# Patient Record
Sex: FEMALE | Race: WHITE | NOT HISPANIC OR LATINO | Employment: FULL TIME | URBAN - METROPOLITAN AREA
[De-identification: names, ages, dates, MRNs, and addresses within clinical notes are randomized per-mention and may not be internally consistent; named-entity substitution may affect disease eponyms.]

---

## 2017-01-18 DIAGNOSIS — I10 ESSENTIAL HYPERTENSION, MALIGNANT: Primary | ICD-10-CM

## 2017-01-18 NOTE — TELEPHONE ENCOUNTER
Marli refill was provided to patient on 12/26/2016.    Team - please assist patient with scheduling an office visit, then route refill request for provider for review.    Lupis Arevalo RN

## 2017-01-18 NOTE — TELEPHONE ENCOUNTER
lisinopril (PRINIVIL/ZESTRIL) 20 MG tablet      Last Written Prescription Date: 12/26/16  Last Fill Quantity: 30, # refills: 0  Last Office Visit with G, P or [facility] prescribing provider: 12/31/15       POTASSIUM   Date Value Ref Range Status   08/25/2016 4.1 3.4 - 5.3 mmol/L Final     CREATININE   Date Value Ref Range Status   08/25/2016 0.67 0.52 - 1.04 mg/dL Final     BP Readings from Last 3 Encounters:   12/31/15 118/74   08/25/14 118/70   08/02/13 117/73

## 2017-01-20 RX ORDER — LISINOPRIL 20 MG/1
TABLET ORAL
Qty: 30 TABLET | Refills: 0 | Status: SHIPPED | OUTPATIENT
Start: 2017-01-20 | End: 2017-02-03

## 2017-01-26 ENCOUNTER — TRANSFERRED RECORDS (OUTPATIENT)
Dept: HEALTH INFORMATION MANAGEMENT | Facility: CLINIC | Age: 52
End: 2017-01-26

## 2017-02-03 ENCOUNTER — OFFICE VISIT (OUTPATIENT)
Dept: FAMILY MEDICINE | Facility: CLINIC | Age: 52
End: 2017-02-03
Payer: COMMERCIAL

## 2017-02-03 VITALS
HEART RATE: 93 BPM | DIASTOLIC BLOOD PRESSURE: 60 MMHG | RESPIRATION RATE: 12 BRPM | BODY MASS INDEX: 27.91 KG/M2 | WEIGHT: 157.5 LBS | SYSTOLIC BLOOD PRESSURE: 110 MMHG | TEMPERATURE: 98.2 F | OXYGEN SATURATION: 99 % | HEIGHT: 63 IN

## 2017-02-03 DIAGNOSIS — Z23 NEED FOR PROPHYLACTIC VACCINATION AND INOCULATION AGAINST INFLUENZA: ICD-10-CM

## 2017-02-03 DIAGNOSIS — Z12.11 SCREEN FOR COLON CANCER: ICD-10-CM

## 2017-02-03 DIAGNOSIS — Z12.31 VISIT FOR SCREENING MAMMOGRAM: ICD-10-CM

## 2017-02-03 DIAGNOSIS — Z23 NEED FOR SHINGLES VACCINE: ICD-10-CM

## 2017-02-03 DIAGNOSIS — I10 BENIGN ESSENTIAL HYPERTENSION: Primary | ICD-10-CM

## 2017-02-03 LAB
ANION GAP SERPL CALCULATED.3IONS-SCNC: 11 MMOL/L (ref 3–14)
BUN SERPL-MCNC: 14 MG/DL (ref 7–30)
CALCIUM SERPL-MCNC: 8.8 MG/DL (ref 8.5–10.1)
CHLORIDE SERPL-SCNC: 102 MMOL/L (ref 94–109)
CO2 SERPL-SCNC: 28 MMOL/L (ref 20–32)
CREAT SERPL-MCNC: 0.82 MG/DL (ref 0.52–1.04)
CREAT UR-MCNC: 91 MG/DL
GFR SERPL CREATININE-BSD FRML MDRD: 74 ML/MIN/1.7M2
GLUCOSE SERPL-MCNC: 94 MG/DL (ref 70–99)
MICROALBUMIN UR-MCNC: <5 MG/L
MICROALBUMIN/CREAT UR: NORMAL MG/G CR (ref 0–25)
POTASSIUM SERPL-SCNC: 4 MMOL/L (ref 3.4–5.3)
SODIUM SERPL-SCNC: 141 MMOL/L (ref 133–144)
TSH SERPL DL<=0.005 MIU/L-ACNC: 1.56 MU/L (ref 0.4–4)

## 2017-02-03 PROCEDURE — 90471 IMMUNIZATION ADMIN: CPT | Performed by: FAMILY MEDICINE

## 2017-02-03 PROCEDURE — 90686 IIV4 VACC NO PRSV 0.5 ML IM: CPT | Performed by: FAMILY MEDICINE

## 2017-02-03 PROCEDURE — 80048 BASIC METABOLIC PNL TOTAL CA: CPT | Performed by: FAMILY MEDICINE

## 2017-02-03 PROCEDURE — 90472 IMMUNIZATION ADMIN EACH ADD: CPT | Performed by: FAMILY MEDICINE

## 2017-02-03 PROCEDURE — 36415 COLL VENOUS BLD VENIPUNCTURE: CPT | Performed by: FAMILY MEDICINE

## 2017-02-03 PROCEDURE — 84443 ASSAY THYROID STIM HORMONE: CPT | Performed by: FAMILY MEDICINE

## 2017-02-03 PROCEDURE — 82043 UR ALBUMIN QUANTITATIVE: CPT | Performed by: FAMILY MEDICINE

## 2017-02-03 PROCEDURE — 99213 OFFICE O/P EST LOW 20 MIN: CPT | Mod: 25 | Performed by: FAMILY MEDICINE

## 2017-02-03 PROCEDURE — 90736 HZV VACCINE LIVE SUBQ: CPT | Performed by: FAMILY MEDICINE

## 2017-02-03 RX ORDER — LISINOPRIL 20 MG/1
20 TABLET ORAL DAILY
Qty: 90 TABLET | Refills: 3 | Status: SHIPPED | OUTPATIENT
Start: 2017-02-03 | End: 2018-04-10

## 2017-02-03 NOTE — MR AVS SNAPSHOT
After Visit Summary   2/3/2017    Ruth Resendez    MRN: [id number]           Patient Information     Date Of Birth          1965        Visit Information        Provider Department      2/3/2017 1:20 PM Renee Mendez MD [facility]        Today's Diagnoses     Benign essential hypertension    -  1     Screen for colon cancer         Visit for screening mammogram         Need for prophylactic vaccination and inoculation against influenza         Need for shingles vaccine           Care Instructions    Please call [facility] (formerly called [facility]) at [phone] to schedule colonoscopy.     Increase aerobic exercise.         Follow-ups after your visit        Additional Services     GASTROENTEROLOGY ADULT REF PROCEDURE ONLY       Last Lab Result: CREATININE (mg/dL)       Date                     Value                 08/25/2016               0.67             ----------  Body mass index is 27.91 kg/(m^2).     Needed:  No  Language:  English    Patient will be contacted to schedule procedure.     Please be aware that coverage of these services is subject to the terms and limitations of your health insurance plan.  Call member services at your health plan with any benefit or coverage questions.  Any procedures must be performed at a [facility] facility OR coordinated by your clinic's referral office.    Please bring the following with you to your appointment:    (1) Any X-Rays, CTs or MRIs which have been performed.  Contact the facility where they were done to arrange for  prior to your scheduled appointment.    (2) List of current medications   (3) This referral request   (4) Any documents/labs given to you for this referral                  Your next 10 appointments already scheduled     Feb 16, 2017  9:00 AM   PHYSICAL with Martin Parsons MD   [facility]  [facility] [place])    [address]  [address]  [place] [address]   [phone]            Feb 16, 2017  9:30 AM   MA SCREEN WITH IMPLANTS DIGITAL BILAT with WEMA1   [facility] [place] ([facility] [place])    [address], [address]  [place] [address]   [phone]           Do not use any powder, lotion or deodorant under your arms or on your breast. If you do, we will ask you to remove it before your exam.  Wear comfortable, two-piece clothing.  If you have any allergies, tell your care team.  Bring any previous mammograms from other facilities or have them mailed to the breast center.              Who to contact     If you have questions or need follow up information about today's clinic visit or your schedule please contact [facility] directly at [phone].  Normal or non-critical lab and imaging results will be communicated to you by Wattvisionhart, letter or phone within 4 business days after the clinic has received the results. If you do not hear from us within 7 days, please contact the clinic through Wattvisionhart or phone. If you have a critical or abnormal lab result, we will notify you by phone as soon as possible.  Submit refill requests through Vital Vio or call your pharmacy and they will forward the refill request to us. Please allow 3 business days for your refill to be completed.          Additional Information About Your Visit        Vital Vio Information     Vital Vio gives you secure access to your electronic health record. If you see a primary care provider, you can also send messages to your care team and make appointments. If you have questions, please call your primary care clinic.  If you do not have a primary care provider, please call [phone] and they will assist you.        Care EveryWhere ID     This is your Care EveryWhere ID. This could be used by other organizations to access your [facility]  "records  [id number]        Your Vitals Were     Pulse Temperature Respirations Height BMI (Body Mass Index) Pulse Oximetry    93 98.2  F (36.8  C) (Oral) 12 5' 3\" (1.6 m) 27.91 kg/m2 99%       Blood Pressure from Last 3 Encounters:   02/03/17 110/60   12/31/15 118/74   08/25/14 118/70    Weight from Last 3 Encounters:   02/03/17 157 lb 8 oz (71.442 kg)   12/31/15 154 lb 12.8 oz (70.217 kg)   08/25/14 147 lb 1.6 oz (66.724 kg)              We Performed the Following     Albumin Random Urine Quantitative     Basic metabolic panel     GASTROENTEROLOGY ADULT REF PROCEDURE ONLY     TSH with free T4 reflex     ZOSTER VACC LIVE SUBQ NJX          Today's Medication Changes          These changes are accurate as of: 2/3/17  2:05 PM.  If you have any questions, ask your nurse or doctor.               These medicines have changed or have updated prescriptions.        Dose/Directions    lisinopril 20 MG tablet   Commonly known as:  PRINIVIL/ZESTRIL   This may have changed:  See the new instructions.   Used for:  Benign essential hypertension   Changed by:  Renee Mendez MD        Dose:  20 mg   Take 1 tablet (20 mg) by mouth daily   Quantity:  90 tablet   Refills:  3            Where to get your medicines      These medications were sent to [facility] - [place], [place] - [address]  [address], [facility] [address]    Hours:  Mon-Fri 8:30am-5:00pm Toll Free [phone] Phone:  [phone]    - lisinopril 20 MG tablet             Primary Care Provider Office Phone # Fax #    Renee Mendez [phone]       [facility] [address] [place] [address] [address]  [facility] [address]        Thank you!     Thank you for choosing [facility]  for your care. Our goal is always to provide you with excellent care. Hearing back from our patients is one way we can continue to improve our services. Please take a few minutes to complete the " written survey that you may receive in the mail after your visit with us. Thank you!             Your Updated Medication List - Protect others around you: Learn how to safely use, store and throw away your medicines at www.disposemymeds.org.          This list is accurate as of: 2/3/17  2:05 PM.  Always use your most recent med list.                   Brand Name Dispense Instructions for use    lisinopril 20 MG tablet    PRINIVIL/ZESTRIL    90 tablet    Take 1 tablet (20 mg) by mouth daily

## 2017-02-03 NOTE — PROGRESS NOTES
SUBJECTIVE:                                                    Ruth Resendez is a 51 year old female who presents to clinic today for the following health issues:      Hypertension Follow-up      Outpatient blood pressures are being checked at home.  Results are 125/70.    Low Salt Diet: no added salt       Amount of exercise or physical activity: 2-3 days/week for an average of 45-60 minutes    Problems taking medications regularly: No    Medication side effects: none    Diet: regular (no restrictions)        Problem list and histories reviewed & adjusted, as indicated.  Additional history: as documented      HTN: Home readings are 125/70. Denies lightheadedness or dizziness. She will have rare times where she feels lightheaded after bending and getting up too quickly but this has been better since we last talked. Denies chest pain/pressure, palpitations.     Additional Notes  -She is still having periods but they are not regular. She sees gyn yearly  -She states despite her efforts, she is unable to lose weight. She is doing a barre class 3-4x a week and she is eating healthy.   -Denies blood in stool or changes in stool.   -Mammogram scheduled for  with gyn office, gets yearly.   -reflux is controlled.          Patient Active Problem List   Diagnosis     Hypertension goal BP (blood pressure) < 140/90     CARDIOVASCULAR SCREENING; LDL GOAL LESS THAN 160     GERD (gastroesophageal reflux disease)     Rhinitis     Microscopic hematuria     Past Surgical History   Procedure Laterality Date     Surgical history of -        eye surgery as a child following a sledding injury       Social History   Substance Use Topics     Smoking status: Never Smoker      Smokeless tobacco: Never Used     Alcohol Use: Yes     Family History   Problem Relation Age of Onset     Respiratory Father      lung disease, former smoker, fatal age histiocystosis, lawn chem,  age 62     CANCER Paternal Grandmother      throat  "cancer     Asthma No family hx of      C.A.D. No family hx of      DIABETES No family hx of      Hypertension No family hx of      CEREBROVASCULAR DISEASE No family hx of      Breast Cancer No family hx of      Cancer - colorectal Other      uncle (I didn't ask whether maternal or paternal), fatal in 60s     Prostate Cancer No family hx of      Neurologic Disorder Mother      parkinsons onset at 66         Current Outpatient Prescriptions   Medication Sig Dispense Refill     lisinopril (PRINIVIL/ZESTRIL) 20 MG tablet TAKE 1 TABLET BY MOUTH EVERY DAY 30 tablet 0     No Known Allergies    ROS:  Constitutional, HEENT, cardiovascular, pulmonary, gi and gu systems are negative, except as otherwise noted.    OBJECTIVE:                                                    /60 mmHg  Pulse 93  Temp(Src) 98.2  F (36.8  C) (Oral)  Resp 12  Ht 1.6 m (5' 3\")  Wt 71.442 kg (157 lb 8 oz)  BMI 27.91 kg/m2  SpO2 99%  Body mass index is 27.91 kg/(m^2).     GENERAL: healthy, alert and no distress  RESP: lungs clear to auscultation - no rales, rhonchi or wheezes  CV: regular rate and rhythm, normal S1 S2, no S3 or S4, no murmur, click or rub, no peripheral edema and peripheral pulses strong  MS: no gross musculoskeletal defects noted, no edema  PSYCH: mentation appears normal, affect normal/bright    Diagnostic Test Results:  none      ASSESSMENT/PLAN:                                                        1. Benign essential hypertension  At goal. Continue current medications.orthostatic precautions reviewed.   - lisinopril (PRINIVIL/ZESTRIL) 20 MG tablet; Take 1 tablet (20 mg) by mouth daily  Dispense: 90 tablet; Refill: 3  - Basic metabolic panel  - TSH with free T4 reflex  - Albumin Random Urine Quantitative    2. Screen for colon cancer  Reviewed colon cancer screening indications/testing. She will schedule  - GASTROENTEROLOGY ADULT REF PROCEDURE ONLY    3. Visit for screening mammogram  Mammogram scheduled.     4. Need " for prophylactic vaccination and inoculation against influenza  Vaccine given today.      5. Need for shingles vaccine  Vaccine given today. Patient aware this med may not be covered by insurance.   - ZOSTER VACC LIVE SUBQ NJX    See Patient Instructions  Patient Instructions   Please call [facility] (formerly called [facility]) at [phone] to schedule colonoscopy.     Increase aerobic exercise.           This document serves as a record of the services and decisions personally performed and made by Renee Mendez MD. It was created on her behalf by Lissette Willoughby,a trained medical scribe. The creation of this document is based the provider's statements to the medical scribe.  Lissette Willoughby February 3, 2017 1:34 PM       Renee Mendez MD  [facility]

## 2017-02-03 NOTE — NURSING NOTE
"Chief Complaint   Patient presents with     Refill Request       Initial /60 mmHg  Pulse 93  Temp(Src) 98.2  F (36.8  C) (Oral)  Resp 12  Ht 1.6 m (5' 3\")  Wt 71.442 kg (157 lb 8 oz)  BMI 27.91 kg/m2  SpO2 99% Estimated body mass index is 27.91 kg/(m^2) as calculated from the following:    Height as of this encounter: 1.6 m (5' 3\").    Weight as of this encounter: 71.442 kg (157 lb 8 oz).  BP completed using cuff size: zaida Headley        "

## 2017-02-03 NOTE — Clinical Note
Please abstract the following data from this visit with this patient into the appropriate field in Epic:  Mammogram done on this date: 11/2015,  (approximately), by this group: [facility], results were normal.

## 2017-02-03 NOTE — PROGRESS NOTES
Injectable Influenza Immunization Documentation    1.  Is the person to be vaccinated sick today?  No    2. Does the person to be vaccinated have an allergy to eggs or to a component of the vaccine?  No    3. Has the person to be vaccinated today ever had a serious reaction to influenza vaccine in the past?  No    4. Has the person to be vaccinated ever had Guillain-[place] syndrome?  No     Form completed by Adrianne

## 2017-02-03 NOTE — PATIENT INSTRUCTIONS
Please call [facility] (formerly called [facility]) at [phone] to schedule colonoscopy.     Increase aerobic exercise.

## 2017-02-08 VITALS
HEART RATE: 84 BPM | HEIGHT: 64 IN | BODY MASS INDEX: 25.1 KG/M2 | WEIGHT: 147 LBS | DIASTOLIC BLOOD PRESSURE: 86 MMHG | SYSTOLIC BLOOD PRESSURE: 124 MMHG

## 2017-02-16 ENCOUNTER — OFFICE VISIT (OUTPATIENT)
Dept: OBGYN | Facility: CLINIC | Age: 52
End: 2017-02-16
Payer: COMMERCIAL

## 2017-02-16 ENCOUNTER — RADIANT APPOINTMENT (OUTPATIENT)
Dept: MAMMOGRAPHY | Facility: CLINIC | Age: 52
End: 2017-02-16
Payer: COMMERCIAL

## 2017-02-16 VITALS
SYSTOLIC BLOOD PRESSURE: 100 MMHG | HEART RATE: 72 BPM | BODY MASS INDEX: 27.29 KG/M2 | HEIGHT: 63 IN | DIASTOLIC BLOOD PRESSURE: 72 MMHG | WEIGHT: 154 LBS

## 2017-02-16 DIAGNOSIS — Z12.31 VISIT FOR SCREENING MAMMOGRAM: ICD-10-CM

## 2017-02-16 DIAGNOSIS — Z01.419 ENCOUNTER FOR GYNECOLOGICAL EXAMINATION WITHOUT ABNORMAL FINDING: Primary | ICD-10-CM

## 2017-02-16 DIAGNOSIS — I10 HYPERTENSION GOAL BP (BLOOD PRESSURE) < 140/90: ICD-10-CM

## 2017-02-16 PROCEDURE — 99396 PREV VISIT EST AGE 40-64: CPT | Performed by: OBSTETRICS & GYNECOLOGY

## 2017-02-16 PROCEDURE — 87624 HPV HI-RISK TYP POOLED RSLT: CPT | Performed by: OBSTETRICS & GYNECOLOGY

## 2017-02-16 PROCEDURE — G0145 SCR C/V CYTO,THINLAYER,RESCR: HCPCS | Performed by: OBSTETRICS & GYNECOLOGY

## 2017-02-16 PROCEDURE — G0202 SCR MAMMO BI INCL CAD: HCPCS | Mod: TC

## 2017-02-16 ASSESSMENT — ANXIETY QUESTIONNAIRES
7. FEELING AFRAID AS IF SOMETHING AWFUL MIGHT HAPPEN: NOT AT ALL
1. FEELING NERVOUS, ANXIOUS, OR ON EDGE: NOT AT ALL
2. NOT BEING ABLE TO STOP OR CONTROL WORRYING: NOT AT ALL
6. BECOMING EASILY ANNOYED OR IRRITABLE: NOT AT ALL
GAD7 TOTAL SCORE: 0
3. WORRYING TOO MUCH ABOUT DIFFERENT THINGS: NOT AT ALL
5. BEING SO RESTLESS THAT IT IS HARD TO SIT STILL: NOT AT ALL

## 2017-02-16 ASSESSMENT — PATIENT HEALTH QUESTIONNAIRE - PHQ9: 5. POOR APPETITE OR OVEREATING: NOT AT ALL

## 2017-02-16 NOTE — PROGRESS NOTES
Ruth is a 51 year old  female who presents for annual exam.     Besides routine health maintenance, she has no other health concerns today .    HPI:  The patient's PCP is Renee Mendez MD at [facility].  Doing well. Cycles becoming irregular. Getting PMS now off OCPs and with irregular menses. No menopausal symptoms.  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  He had vasectomy      GYNECOLOGIC HISTORY:    Patient's last menstrual period was 02/10/2017 (exact date).  Her current contraception method is: vasectomy.  She  reports that she has never smoked. She has never used smokeless tobacco.    Patient is sexually active.  STD testing offered?  Declined  Last PHQ-9 score on record =   PHQ-9 SCORE 2017   Total Score -   Total Score 0     Last GAD7 score on record =   DAV-7 SCORE 2017   Total Score -   Total Score 0     Alcohol Score = 2    HEALTH MAINTENANCE:  Cholesterol: 14   Total= 192, Triglycerides=143, HDL=65, LDL=98, FBS=94, TSH=2/3/17-1.56-has done with pcp  Last Mammo: , Result: normal, Next Mammo: today   Pap: 12 neg, HPV-  Colonoscopy:  Due this year. Will schedule an appointment  Dexa:  Never    Health maintenance updated:  yes    HISTORY:  Obstetric History       T0      TAB0   SAB2   E0   M0   L4       # Outcome Date GA Lbr Diego/2nd Weight Sex Delivery Anes PTL Lv   6 2008 11w0d          5 SAB            4 Para            3 Para            2 Para            1 Para                   Patient Active Problem List   Diagnosis     Hypertension goal BP (blood pressure) < 140/90     CARDIOVASCULAR SCREENING; LDL GOAL LESS THAN 160     GERD (gastroesophageal reflux disease)     Rhinitis     Microscopic hematuria     Past Surgical History   Procedure Laterality Date     Surgical history of -        eye surgery as a child following a sledding injury     As enlarge breast with implant        Social History   Substance Use Topics  "    Smoking status: Never Smoker     Smokeless tobacco: Never Used     Alcohol use 0.0 oz/week     0 Standard drinks or equivalent per week      Problem (# of Occurrences) Relation (Name,Age of Onset)    Breast Cancer (1) Other    CANCER (1) Paternal Grandmother: throat cancer    Cancer - colorectal (1) Other: uncle (I didn't ask whether maternal or paternal), fatal in 60s    Neurologic Disorder (1) Mother: parkinsons onset at 66    Respiratory (1) Father: lung disease, former smoker, fatal age histiocystosis, lawn chem,  age 62       Negative family history of: Asthma, C.A.D., DIABETES, Hypertension, CEREBROVASCULAR DISEASE, Prostate Cancer            Current Outpatient Prescriptions   Medication Sig     lisinopril (PRINIVIL/ZESTRIL) 20 MG tablet Take 1 tablet (20 mg) by mouth daily     No current facility-administered medications for this visit.      No Known Allergies    Past medical, surgical, social and family histories were reviewed and updated in EPIC.    ROS:   12 point review of systems negative other than symptoms noted below.  Genitourinary: Cramps, Hot Flashes and Significant PMS    EXAM:  /72  Pulse 72  Ht 5' 3\" (1.6 m)  Wt 154 lb (69.9 kg)  LMP 02/10/2017 (Exact Date)  BMI 27.28 kg/m2   BMI: Body mass index is 27.28 kg/(m^2).    PHYSICAL EXAM:  Constitutional:  Appearance: Well nourished, well developed, alert, in no acute distress  Neck:  Lymph Nodes:  No lymphadenopathy present    Thyroid:  Gland size normal, nontender, no nodules or masses present  on palpation  Chest:  Respiratory Effort:  Breathing unlabored  Cardiovascular:    Heart: Auscultation:  Regular rate, normal rhythm, no murmurs present  Breasts: Inspection of Breasts:  No lymphadenopathy present    Palpation of Breasts and Axillae:  No masses present on palpation, no  breast tenderness, IMPLANTS BILATERALLY    Axillary Lymph Nodes:  No lymphadenopathy present  Gastrointestinal:   Abdominal Examination:  Abdomen nontender to " palpation, tone normal without rigidity or guarding, no masses present, umbilicus without lesions   Liver and Spleen:  No hepatomegaly present, liver nontender to palpation    Hernias:  No hernias present  Lymphatic: Lymph Nodes:  No other lymphadenopathy present  Skin:  General Inspection:  No rashes present, no lesions present, no areas of  discoloration    Genitalia and Groin:  No rashes present, no lesions present, no areas of  discoloration, no masses present  Neurologic/Psychiatric:    Mental Status:  Oriented X3     Pelvic Exam:  External Genitalia:     Normal appearance for age, no discharge present, no tenderness present, no inflammatory lesions present, color normal  Vagina:     Normal vaginal vault without central or paravaginal defects, no discharge present, no inflammatory lesions present, no masses present  Bladder:     Nontender to palpation  Urethra:   Urethral Body:  Urethra palpation normal, urethra structural support normal   Urethral Meatus:  No erythema or lesions present  Cervix:     Appearance healthy, no lesions present, nontender to palpation, no bleeding present  Uterus:     Nontender to palpation, no masses present, position anteflexed, mobility: normal  Adnexa:     No adnexal tenderness present, no adnexal masses present  Perineum:     Perineum within normal limits, no evidence of trauma, no rashes or skin lesions present  Anus:     Anus within normal limits, no hemorrhoids present  Inguinal Lymph Nodes:     No lymphadenopathy present  Pubic Hair:     Normal pubic hair distribution for age  Genitalia and Groin:     No rashes present, no lesions present, no areas of discoloration, no masses present    COUNSELING:   Reviewed preventive health counseling, as reflected in patient instructions       Regular exercise    BMI: Body mass index is 27.28 kg/(m^2).  Weight management plan: Patient was referred to their PCP to discuss a diet and exercise plan.    ASSESSMENT:  51 year old female with  satisfactory annual exam.    ICD-10-CM    1. Encounter for gynecological examination without abnormal finding Z01.419 Pap imaged thin layer screen with HPV - recommended age 30 - 65     HPV High Risk Types DNA Cervical   2. Hypertension goal BP (blood pressure) < 140/90 I10        PLAN:  Pap done today.   Discussed perimenopause and changes in cycles. Can track ovulation symptoms to get an idea if bad PMS is coming.   Encouraged colonoscopy    Martin Parsons MD

## 2017-02-16 NOTE — MR AVS SNAPSHOT
After Visit Summary   2/16/2017    Ruth Resendez    MRN: [id number]           Patient Information     Date Of Birth          1965        Visit Information        Provider Department      2/16/2017 9:00 AM Martin Parsons MD [facility]        Today's Diagnoses     Encounter for gynecological examination without abnormal finding    -  1    Hypertension goal BP (blood pressure) < 140/90           Follow-ups after your visit        Your next 10 appointments already scheduled     Feb 16, 2017  9:30 AM CST   MA SCREEN WITH IMPLANTS DIGITAL BILAT with WEMA1   [facility] [place] ([facility])    [address], [address]  [facility] [address] [phone]           Do not use any powder, lotion or deodorant under your arms or on your breast. If you do, we will ask you to remove it before your exam.  Wear comfortable, two-piece clothing.  If you have any allergies, tell your care team.  Bring any previous mammograms from other facilities or have them mailed to the breast center.              Who to contact     If you have questions or need follow up information about today's clinic visit or your schedule please contact [facility] directly at [phone].  Normal or non-critical lab and imaging results will be communicated to you by MyChart, letter or phone within 4 business days after the clinic has received the results. If you do not hear from us within 7 days, please contact the clinic through Higher Onehart or phone. If you have a critical or abnormal lab result, we will notify you by phone as soon as possible.  Submit refill requests through Globant or call your pharmacy and they will forward the refill request to us. Please allow 3 business days for your refill to be completed.          Additional Information About Your Visit        Higher OneharGuestCentric Systems Information     Globant gives you secure access to your electronic  "health record. If you see a primary care provider, you can also send messages to your care team and make appointments. If you have questions, please call your primary care clinic.  If you do not have a primary care provider, please call [phone] and they will assist you.        Care EveryWhere ID     This is your Care EveryWhere ID. This could be used by other organizations to access your [facility] medical records  [id number]        Your Vitals Were     Pulse Height Last Period BMI (Body Mass Index)          72 5' 3\" (1.6 m) 02/10/2017 (Exact Date) 27.28 kg/m2         Blood Pressure from Last 3 Encounters:   02/16/17 100/72   10/02/14 124/86   02/03/17 110/60    Weight from Last 3 Encounters:   02/16/17 154 lb (69.9 kg)   10/02/14 147 lb (66.7 kg)   02/03/17 157 lb 8 oz (71.4 kg)              We Performed the Following     HPV High Risk Types DNA Cervical     Pap imaged thin layer screen with HPV - recommended age 30 - 65        Primary Care Provider Office Phone # Fax #    Renee Nan Mendez [phone]       [facility] [address] [place] [address] N  [facility] [address]        Thank you!     Thank you for choosing [facility]  for your care. Our goal is always to provide you with excellent care. Hearing back from our patients is one way we can continue to improve our services. Please take a few minutes to complete the written survey that you may receive in the mail after your visit with us. Thank you!             Your Updated Medication List - Protect others around you: Learn how to safely use, store and throw away your medicines at www.disposemymeds.org.          This list is accurate as of: 2/16/17  9:28 AM.  Always use your most recent med list.                   Brand Name Dispense Instructions for use    lisinopril 20 MG tablet    PRINIVIL/ZESTRIL    90 tablet    Take 1 tablet (20 mg) by mouth daily         "

## 2017-02-17 ASSESSMENT — ANXIETY QUESTIONNAIRES: GAD7 TOTAL SCORE: 0

## 2017-02-17 ASSESSMENT — PATIENT HEALTH QUESTIONNAIRE - PHQ9: SUM OF ALL RESPONSES TO PHQ QUESTIONS 1-9: 0

## 2017-02-21 LAB
COPATH REPORT: NORMAL
PAP: NORMAL

## 2017-02-22 LAB
FINAL DIAGNOSIS: NORMAL
HPV HR 12 DNA CVX QL NAA+PROBE: NEGATIVE
HPV16 DNA SPEC QL NAA+PROBE: NEGATIVE
HPV18 DNA SPEC QL NAA+PROBE: NEGATIVE
SPECIMEN DESCRIPTION: NORMAL

## 2017-08-16 ENCOUNTER — VIRTUAL VISIT (OUTPATIENT)
Dept: FAMILY MEDICINE | Facility: OTHER | Age: 52
End: 2017-08-16

## 2017-08-16 NOTE — PROGRESS NOTES
"Date:   Clinician: Melody Goldstein  Clinician NPI: [id number]  Patient: Ruth Resendez  Patient : 1965  Patient Address: [address] [place] [address], [place] [address]  Patient Phone: [phone]  Visit Protocol: Conjunctivitis  Patient Summary:  Ruth is a 51 year old (: 1965 ) who initiated a Visit for evaluation of conjunctivitis.  When asked the question \"Please sign me up to receive news, health information and promotions. \", Ruth responded \"No\".    Ruth uploaded images of her eye condition.   Her symptoms started gradually, have persisted for 1 to 3 days and affect both eyes equally. Ruth describes her symptoms in the following way: the white part of the eye is mostly red. Her eye(s) itch. She has mild eye pain. There is thick yellow drainage coming from her eye(s). Her eye(s) is stuck shut in the morning from the drainage. She also notes the eyelid the eye(s) is moderately swollen and the patient has some difficulty completely opening the eye(s).   She has a mild headache and denies having a fever.   Ruth denies:     Recent injury to the eye    Getting dust, dirt, or some other material in the eye(s)    Significant sensitivity to light    Receiving eye surgery in the past month    Being treated for an eye infection in the past 2 to 4 weeks    A new rash on the face    Having a bump on the eyelid    Glaucoma    Receiving a diagnosis of conjunctivitis within the past month    Wearing contact lenses    Subconjunctival hemorrhage    Having seasonal allergies     Ruth has not been recently exposed to anyone with an eye infection she has not recently had a respiratory infection. She is currently being treated for hypertension. Her hypertension is well-controlled (no higher than 130/90).   Ruth has not been vaccinated for chickenpox and has been vaccinated for shingles. The patient had chickenpox in the past.   Ruth states that she does not need proof of treatment for her eye condition " before returning to school, work, or .   Ruth is not currently taking any medications to treat her symptoms.   She denies pregnancy and denies breastfeeding. She has menstruated in the past month.   Ruth does not smoke or use smokeless tobacco.   MEDICATIONS:  Lisinopril   , ALLERGIES:  NKDA   Clinician Response:  Dear Ruth,  Based on the information you provided, you most likely have bacterial conjunctivitis, more commonly called Pink Eye.   I recommend taking the following OTC medication(s):   Artificial tears as needed. Apply 1 or 2 drops in the affected eye(s). These eye drops help to reduce dryness and irritation of your eyes. However, this medication does not reduce the spread of viruses that can cause eye infections. This is an over-the-counter medication that does not require a prescription.   I am prescribing the following medication(s):   Polymyxin B-Trimethoprim Solution (Polytrim). Apply 1 drop in the affected eye(s) every 3 hours, up to 6 times a day for 7 days.   I am prescribing an antibiotic medication. Many infections can be caused by either bacteria or a virus and symptoms are often very similar, so it is possible that this is a viral infection. Antibiotics are only effective against bacterial infections, so when it is caused by a virus, the medication will not help symptoms improve or make it less contagious.   To prevent the spread of your infection, do not touch your hands to your eyes - even to itch them. Wash your hands regularly - at least once per hour. Change your towel and washcloth daily and don't share them with others. Throw away any eye cosmetics you've been using, particularly mascara. Don't use anyone else's eye cosmetics or personal eye-care items.   It is very important to use the eye drops exactly as directed. Do not use the eye drops longer than prescribed as this will increase the chance of side-effects. If you are taking your medications as directed and you do not see  any improvements after 2 days, you need to be seen in a clinic for further evaluation.   Diagnosis: Bacterial Conjunctivitis  Diagnosis ICD: H10.9  Prescription: polymyxin B/trimethoprim (Polytrim) 10,000 units-1mg 1ml ophthalmic solution 10 ml, 7 days supply. One drop in the affected eye(s) every 3 hours up to 6 times a day for 7 days. Refills: 0, Refill as needed: no, Allow substitutions: yes  Pharmacy: [id number] - [phone] - [address], [place] [address]

## 2017-09-20 ENCOUNTER — TRANSFERRED RECORDS (OUTPATIENT)
Dept: HEALTH INFORMATION MANAGEMENT | Facility: CLINIC | Age: 52
End: 2017-09-20

## 2018-02-22 ENCOUNTER — RADIANT APPOINTMENT (OUTPATIENT)
Dept: MAMMOGRAPHY | Facility: CLINIC | Age: 53
End: 2018-02-22
Payer: COMMERCIAL

## 2018-02-22 ENCOUNTER — OFFICE VISIT (OUTPATIENT)
Dept: OBGYN | Facility: CLINIC | Age: 53
End: 2018-02-22
Payer: COMMERCIAL

## 2018-02-22 VITALS
HEIGHT: 63 IN | WEIGHT: 156 LBS | BODY MASS INDEX: 27.64 KG/M2 | DIASTOLIC BLOOD PRESSURE: 60 MMHG | SYSTOLIC BLOOD PRESSURE: 110 MMHG

## 2018-02-22 DIAGNOSIS — Z01.419 ENCOUNTER FOR GYNECOLOGICAL EXAMINATION WITHOUT ABNORMAL FINDING: Primary | ICD-10-CM

## 2018-02-22 DIAGNOSIS — Z12.31 VISIT FOR SCREENING MAMMOGRAM: ICD-10-CM

## 2018-02-22 PROCEDURE — 77063 BREAST TOMOSYNTHESIS BI: CPT | Mod: TC

## 2018-02-22 PROCEDURE — 99396 PREV VISIT EST AGE 40-64: CPT | Performed by: OBSTETRICS & GYNECOLOGY

## 2018-02-22 PROCEDURE — 77067 SCR MAMMO BI INCL CAD: CPT | Mod: TC

## 2018-02-22 ASSESSMENT — ANXIETY QUESTIONNAIRES
2. NOT BEING ABLE TO STOP OR CONTROL WORRYING: NOT AT ALL
1. FEELING NERVOUS, ANXIOUS, OR ON EDGE: NOT AT ALL
GAD7 TOTAL SCORE: 1
5. BEING SO RESTLESS THAT IT IS HARD TO SIT STILL: NOT AT ALL
IF YOU CHECKED OFF ANY PROBLEMS ON THIS QUESTIONNAIRE, HOW DIFFICULT HAVE THESE PROBLEMS MADE IT FOR YOU TO DO YOUR WORK, TAKE CARE OF THINGS AT HOME, OR GET ALONG WITH OTHER PEOPLE: NOT DIFFICULT AT ALL
3. WORRYING TOO MUCH ABOUT DIFFERENT THINGS: NOT AT ALL
7. FEELING AFRAID AS IF SOMETHING AWFUL MIGHT HAPPEN: NOT AT ALL
6. BECOMING EASILY ANNOYED OR IRRITABLE: SEVERAL DAYS

## 2018-02-22 ASSESSMENT — PATIENT HEALTH QUESTIONNAIRE - PHQ9: 5. POOR APPETITE OR OVEREATING: NOT AT ALL

## 2018-02-22 NOTE — MR AVS SNAPSHOT
After Visit Summary   2/22/2018    Ruth Resendez    MRN: [id number]           Patient Information     Date Of Birth          1965        Visit Information        Provider Department      2/22/2018 9:30 AM Martin Parsons MD [facility]        Today's Diagnoses     Encounter for gynecological examination without abnormal finding    -  1       Follow-ups after your visit        Your next 10 appointments already scheduled     Mar 26, 2018   Procedure with Michi Fernandez MD   [facility] [place] [facility] (--)    [address] [place] [address] [phone]              Who to contact     If you have questions or need follow up information about today's clinic visit or your schedule please contact [facility] directly at [phone].  Normal or non-critical lab and imaging results will be communicated to you by MyChart, letter or phone within 4 business days after the clinic has received the results. If you do not hear from us within 7 days, please contact the clinic through MyChart or phone. If you have a critical or abnormal lab result, we will notify you by phone as soon as possible.  Submit refill requests through [employer] or call your pharmacy and they will forward the refill request to us. Please allow 3 business days for your refill to be completed.          Additional Information About Your Visit        MyChart Information     [employer] gives you secure access to your electronic health record. If you see a primary care provider, you can also send messages to your care team and make appointments. If you have questions, please call your primary care clinic.  If you do not have a primary care provider, please call [phone] and they will assist you.        Care EveryWhere ID     This is your Care EveryWhere ID. This could be used by other organizations to access your [facility] medical records  [id number]        Your Vitals Were   "   Height BMI (Body Mass Index)                5' 3\" (1.6 m) 27.63 kg/m2           Blood Pressure from Last 3 Encounters:   02/22/18 110/60   02/16/17 100/72   10/02/14 124/86    Weight from Last 3 Encounters:   02/22/18 156 lb (70.8 kg)   02/16/17 154 lb (69.9 kg)   10/02/14 147 lb (66.7 kg)              Today, you had the following     No orders found for display       Primary Care Provider Office Phone # Fax #    Renee Mendez [phone] [address]  [facility] [address]        Equal Access to Services     RUSSELL ROBISON : Emilee Torre, wakaylee burroughs, qasacha kaalmada ari, adamaris arshad . So [facility] [phone].    ATENCIÓN: Si habla español, tiene a correa disposición servicios gratuitos de asistencia lingüística. Llame al [phone].    We comply with applicable federal civil rights laws and [place] laws. We do not discriminate on the basis of race, color, national origin, age, disability, sex, sexual orientation, or gender identity.            Thank you!     Thank you for choosing [facility]  for your care. Our goal is always to provide you with excellent care. Hearing back from our patients is one way we can continue to improve our services. Please take a few minutes to complete the written survey that you may receive in the mail after your visit with us. Thank you!             Your Updated Medication List - Protect others around you: Learn how to safely use, store and throw away your medicines at www.disposemymeds.org.          This list is accurate as of 2/22/18 12:59 PM.  Always use your most recent med list.                   Brand Name Dispense Instructions for use Diagnosis    lisinopril 20 MG tablet    PRINIVIL/ZESTRIL    90 tablet    Take 1 tablet (20 mg) by mouth daily    Benign essential hypertension         "

## 2018-02-22 NOTE — PROGRESS NOTES
Ruth is a 52 year old  female who presents for annual exam.     Besides routine health maintenance, for about a year has been having irregular cycles, has previously discussed perimenopause symptoms.    HPI:  The patient's PCP is Dr Renee Mendez MD.    Doing well. Exercises regularly. Can't lose weight.   Cycles irregular. No missed menses. No menopausal symptoms.    Some USI with exercise.     GYNECOLOGIC HISTORY:    No LMP recorded. Patient is not currently having periods (Reason: Irregular Periods).  Her current contraception method is: vasectomy.  She  reports that she has never smoked. She has never used smokeless tobacco.  Patient is sexually active.  STD testing offered?  Declined     Last PHQ-9 score on record =   PHQ-9 SCORE 2018   Total Score -   Total Score 1     Last GAD7 score on record =   DAV-7 SCORE 2018   Total Score -   Total Score 1     Alcohol Score = 3    HEALTH MAINTENANCE:  Cholesterol: 14   Total= 192, Triglycerides=143, HDL=65, LDL=98, FBS=94, TSH=1.56  Cholesterol   Date Value Ref Range Status   2014 192 <200 mg/dL Final     Comment:     LDL Cholesterol is the primary guide to therapy.   The NCEP recommends further evaluation of: patients with cholesterol greater   than 200 mg/dL if additional risk factors are present, cholesterol greater   than   240 mg/dL, triglycerides greater than 150 mg/dL, or HDL less than 40 mg/dL.     2013 177 0 - 200 mg/dL Final     Comment:     LDL Cholesterol is the primary guide to therapy.   The NCEP recommends further evaluation of: patients with cholesterol greater   than 200 mg/dL if additional risk factors are present, cholesterol greater   than   240 mg/dL, triglycerides greater than 150 mg/dL, or HDL less than 40 mg/dL.   Last Mammo: 17, Result: normal, Next Mammo: today   Pap:   Lab Results   Component Value Date    PAP NIL, Neg-HPV 2017   Colonoscopy:  Never, has appointment scheduled for  3/26/18 at [facility]  Dexa:  Never  Health maintenance updated:  yes    HISTORY:  Obstetric History       T0      L4     SAB2   TAB0   Ectopic0   Multiple0   Live Births0       # Outcome Date GA Lbr Diego/2nd Weight Sex Delivery Anes PTL Lv   6 SAB  11w0d          5 SAB            4 Para            3 Para            2 Para            1 Para                   Patient Active Problem List   Diagnosis     Hypertension goal BP (blood pressure) < 140/90     CARDIOVASCULAR SCREENING; LDL GOAL LESS THAN 160     GERD (gastroesophageal reflux disease)     Rhinitis     Microscopic hematuria     Past Surgical History:   Procedure Laterality Date     AS ENLARGE BREAST WITH IMPLANT       SURGICAL HISTORY OF -       eye surgery as a child following a sledding injury      Social History   Substance Use Topics     Smoking status: Never Smoker     Smokeless tobacco: Never Used     Alcohol use 0.0 oz/week     0 Standard drinks or equivalent per week      Problem (# of Occurrences) Relation (Name,Age of Onset)    Breast Cancer (1) Other    CANCER (1) Paternal Grandmother: throat cancer    Cancer - colorectal (1) Other: uncle (I didn't ask whether maternal or paternal), fatal in 60s    Neurologic Disorder (1) Mother: parkinsons onset at 66    Respiratory (1) Father: lung disease, former smoker, fatal age histiocystosis, lawn chem,  age 62       Negative family history of: Asthma, C.A.D., DIABETES, Hypertension, CEREBROVASCULAR DISEASE, Prostate Cancer            Current Outpatient Prescriptions   Medication Sig     lisinopril (PRINIVIL/ZESTRIL) 20 MG tablet Take 1 tablet (20 mg) by mouth daily     No current facility-administered medications for this visit.      No Known Allergies    Past medical, surgical, social and family histories were reviewed and updated in EPIC.    ROS:   12 point review of systems negative other than symptoms noted below.  Genitourinary: Heavy Bleeding with Period and  "Incontinence  Psychiatric: Difficulty Sleeping    EXAM:  /60  Ht 5' 3\" (1.6 m)  Wt 156 lb (70.8 kg)  BMI 27.63 kg/m2   BMI: Body mass index is 27.63 kg/(m^2).    PHYSICAL EXAM:  Constitutional:  Appearance: Well nourished, well developed, alert, in no acute distress  Neck:  Lymph Nodes:  No lymphadenopathy present    Thyroid:  Gland size normal, nontender, no nodules or masses present  on palpation  Chest:  Respiratory Effort:  Breathing unlabored  Cardiovascular:    Heart: Auscultation:  Regular rate, normal rhythm, no murmurs present  Breasts: Inspection of Breasts:  No lymphadenopathy present., Palpation of Breasts and Axillae:  No masses present on palpation, no breast tenderness., Axillary Lymph Nodes:  No lymphadenopathy present., No nodularity, asymmetry or nipple discharge bilaterally. and Implants bilaterally  Gastrointestinal:   Abdominal Examination:  Abdomen nontender to palpation, tone normal without rigidity or guarding, no masses present, umbilicus without lesions   Liver and Spleen:  No hepatomegaly present, liver nontender to palpation    Hernias:  No hernias present  Lymphatic: Lymph Nodes:  No other lymphadenopathy present  Skin:  General Inspection:  No rashes present, no lesions present, no areas of  discoloration    Genitalia and Groin:  No rashes present, no lesions present, no areas of  discoloration, no masses present  Neurologic/Psychiatric:    Mental Status:  Oriented X3     Pelvic Exam:  External Genitalia:     Normal appearance for age, no discharge present, no tenderness present, no inflammatory lesions present, color normal  Vagina:     Normal vaginal vault without central or paravaginal defects, no discharge present, no inflammatory lesions present, no masses present  Bladder:     Nontender to palpation  Urethra:   Urethral Body:  Urethra palpation normal, urethra structural support normal   Urethral Meatus:  No erythema or lesions present  Cervix:     Appearance healthy, no " lesions present, nontender to palpation, no bleeding present  Uterus:     Uterus: firm, normal sized and nontender, anteverted in position.   Adnexa:     No adnexal tenderness present, no adnexal masses present  Perineum:     Perineum within normal limits, no evidence of trauma, no rashes or skin lesions present  Anus:     Anus within normal limits, no hemorrhoids present  Inguinal Lymph Nodes:     No lymphadenopathy present  Pubic Hair:     Normal pubic hair distribution for age  Genitalia and Groin:     No rashes present, no lesions present, no areas of discoloration, no masses present      COUNSELING:   Reviewed preventive health counseling, as reflected in patient instructions       Regular exercise    BMI: Body mass index is 27.63 kg/(m^2).  Weight management plan: Continue diet and exercise    ASSESSMENT:  52 year old female with satisfactory annual exam.    ICD-10-CM    1. Encounter for gynecological examination without abnormal finding Z01.419        PLAN:  Discussed USI. Can try Empressa or Tampon. Discussed sling if worsens.  See PCP for HTN. Can have fasting labs with her.   Discussed new Shingles vaccine      Martin Parsons MD

## 2018-02-23 ASSESSMENT — ANXIETY QUESTIONNAIRES: GAD7 TOTAL SCORE: 1

## 2018-02-23 ASSESSMENT — PATIENT HEALTH QUESTIONNAIRE - PHQ9: SUM OF ALL RESPONSES TO PHQ QUESTIONS 1-9: 1

## 2018-03-01 ENCOUNTER — TRANSFERRED RECORDS (OUTPATIENT)
Dept: HEALTH INFORMATION MANAGEMENT | Facility: CLINIC | Age: 53
End: 2018-03-01

## 2018-03-26 ENCOUNTER — HOSPITAL ENCOUNTER (OUTPATIENT)
Facility: AMBULATORY SURGERY CENTER | Age: 53
Discharge: HOME | End: 2018-03-26
Attending: SPECIALIST | Admitting: SPECIALIST
Payer: COMMERCIAL

## 2018-03-26 ENCOUNTER — SURGERY (OUTPATIENT)
Age: 53
End: 2018-03-26

## 2018-03-26 VITALS
SYSTOLIC BLOOD PRESSURE: 103 MMHG | OXYGEN SATURATION: 100 % | DIASTOLIC BLOOD PRESSURE: 65 MMHG | TEMPERATURE: 97.7 F | RESPIRATION RATE: 16 BRPM

## 2018-03-26 LAB — COLONOSCOPY: NORMAL

## 2018-03-26 PROCEDURE — 45385 COLONOSCOPY W/LESION REMOVAL: CPT

## 2018-03-26 PROCEDURE — 88305 TISSUE EXAM BY PATHOLOGIST: CPT | Performed by: SPECIALIST

## 2018-03-26 PROCEDURE — G8907 PT DOC NO EVENTS ON DISCHARG: HCPCS

## 2018-03-26 PROCEDURE — 45381 COLONOSCOPY SUBMUCOUS NJX: CPT

## 2018-03-26 PROCEDURE — G8918 PT W/O PREOP ORDER IV AB PRO: HCPCS

## 2018-03-26 RX ORDER — LIDOCAINE 40 MG/G
CREAM TOPICAL
Status: DISCONTINUED | OUTPATIENT
Start: 2018-03-26 | End: 2018-03-27 | Stop reason: HOSPADM

## 2018-03-26 RX ORDER — FENTANYL CITRATE 50 UG/ML
INJECTION, SOLUTION INTRAMUSCULAR; INTRAVENOUS PRN
Status: DISCONTINUED | OUTPATIENT
Start: 2018-03-26 | End: 2018-03-26 | Stop reason: HOSPADM

## 2018-03-26 RX ORDER — ONDANSETRON 2 MG/ML
4 INJECTION INTRAMUSCULAR; INTRAVENOUS
Status: DISCONTINUED | OUTPATIENT
Start: 2018-03-26 | End: 2018-03-27 | Stop reason: HOSPADM

## 2018-03-26 RX ADMIN — FENTANYL CITRATE 100 MCG: 50 INJECTION, SOLUTION INTRAMUSCULAR; INTRAVENOUS at 12:17

## 2018-03-26 NOTE — H&P
Pre-Endoscopy History and Physical     Ruth Resendez MRN# [id number]   YOB: 1965 Age: 52 year old     Date of Procedure: 3/26/2018  Primary care provider: Renee Mendez  Type of Endoscopy: Colonoscopy with possible biopsy, possible polypectomy  Reason for Procedure: screening  Type of Anesthesia Anticipated: Conscious Sedation    HPI:    Ruth is a 52 year old female who will be undergoing the above procedure.      A history and physical has been performed. The patient's medications and allergies have been reviewed. The risks and benefits of the procedure and the sedation options and risks were discussed with the patient.  All questions were answered and informed consent was obtained.      She denies a personal or family history of anesthesia complications or bleeding disorders.     Patient Active Problem List   Diagnosis     Hypertension goal BP (blood pressure) < 140/90     CARDIOVASCULAR SCREENING; LDL GOAL LESS THAN 160     GERD (gastroesophageal reflux disease)     Rhinitis     Microscopic hematuria        Past Medical History:   Diagnosis Date     C. difficile diarrhea 10/2009    tx with oral flagyl  more thanonce then oral vanco-Resistent to flagyl. Took Vanco for 6 months.     Essential hypertension      Mastitis      Normal vaginal delivery   x 4    last delivery 09     Oral contraceptive use      Pregnancy induced hypertension         Past Surgical History:   Procedure Laterality Date     AS ENLARGE BREAST WITH IMPLANT       SURGICAL HISTORY OF -       eye surgery as a child following a sledding injury       Social History   Substance Use Topics     Smoking status: Never Smoker     Smokeless tobacco: Never Used     Alcohol use 0.0 oz/week     0 Standard drinks or equivalent per week       Family History   Problem Relation Age of Onset     Respiratory Father      lung disease, former smoker, fatal age histiocystosis, lawn chem,  age 62     CANCER Paternal  "Grandmother      throat cancer     Breast Cancer Other      Cancer - colorectal Other      uncle (I didn't ask whether maternal or paternal), fatal in 60s     Neurologic Disorder Mother      parkinsons onset at 66     Asthma No family hx of      C.A.D. No family hx of      DIABETES No family hx of      Hypertension No family hx of      CEREBROVASCULAR DISEASE No family hx of      Prostate Cancer No family hx of        Prior to Admission medications    Medication Sig Start Date End Date Taking? Authorizing Provider   lisinopril (PRINIVIL/ZESTRIL) 20 MG tablet Take 1 tablet (20 mg) by mouth daily 2/3/17  Yes Renee Mendez MD       No Known Allergies     REVIEW OF SYSTEMS:   5 point ROS negative except as noted above in HPI, including Gen., Resp., CV, GI &  system review.    PHYSICAL EXAM:   /71  Temp 97.7  F (36.5  C) (Temporal)  Resp 16  SpO2 98% Estimated body mass index is 27.63 kg/(m^2) as calculated from the following:    Height as of 2/22/18: 1.6 m (5' 3\").    Weight as of 2/22/18: 70.8 kg (156 lb).   GENERAL APPEARANCE: alert, and oriented  MENTAL STATUS: alert  AIRWAY EXAM: Mallampatti Class II (visualization of the soft palate, fauces, and uvula)  RESP: lungs clear to auscultation - no rales, rhonchi or wheezes  CV: regular rates and rhythm  DIAGNOSTICS:    Not indicated    IMPRESSION   ASA Class 2 - Mild systemic disease    PLAN:   Plan for Colonoscopy with possible biopsy, possible polypectomy. We discussed the risks, benefits and alternatives and the patient wished to proceed.    The above has been forwarded to the consulting provider.      Signed Electronically by: Michi Fernandez  March 26, 2018          "

## 2018-03-29 LAB — COPATH REPORT: NORMAL

## 2018-04-10 ENCOUNTER — MYC MEDICAL ADVICE (OUTPATIENT)
Dept: FAMILY MEDICINE | Facility: CLINIC | Age: 53
End: 2018-04-10

## 2018-04-10 DIAGNOSIS — I10 BENIGN ESSENTIAL HYPERTENSION: ICD-10-CM

## 2018-04-10 RX ORDER — LISINOPRIL 20 MG/1
20 TABLET ORAL DAILY
Qty: 30 TABLET | Refills: 0 | Status: SHIPPED | OUTPATIENT
Start: 2018-04-10 | End: 2018-08-01

## 2018-04-10 NOTE — TELEPHONE ENCOUNTER
Requested Prescriptions   Pending Prescriptions Disp Refills     lisinopril (PRINIVIL/ZESTRIL) 20 MG tablet 90 tablet 0     Sig: Take 1 tablet (20 mg) by mouth daily    There is no refill protocol information for this order        BP Readings from Last 3 Encounters:   03/26/18 103/65   02/22/18 110/60   02/16/17 100/72     Creatinine   Date Value Ref Range Status   02/03/2017 0.82 0.52 - 1.04 mg/dL Final       Medication is being filled for 1 time refill only due to:  Patient needs to be seen because it has been more than one year since last visit.     Rakel Godoy RN, BSN

## 2018-04-10 NOTE — TELEPHONE ENCOUNTER
lisinopril      Last Written Prescription Date:  2-3-17  Last Fill Quantity: 90,   # refills: 3  Last Office Visit: 8-16-17  Future Office visit:    Next 5 appointments (look out 90 days)     Apr 19, 2018 10:40 AM CDT   Office Visit with Renee Mendez MD   [facility]    [address] [place] [address] [phone]

## 2018-04-19 ENCOUNTER — OFFICE VISIT (OUTPATIENT)
Dept: FAMILY MEDICINE | Facility: CLINIC | Age: 53
End: 2018-04-19
Payer: COMMERCIAL

## 2018-04-19 VITALS
HEART RATE: 102 BPM | HEIGHT: 63 IN | RESPIRATION RATE: 18 BRPM | DIASTOLIC BLOOD PRESSURE: 62 MMHG | BODY MASS INDEX: 27.64 KG/M2 | OXYGEN SATURATION: 97 % | WEIGHT: 156 LBS | TEMPERATURE: 98.2 F | SYSTOLIC BLOOD PRESSURE: 104 MMHG

## 2018-04-19 DIAGNOSIS — Z23 VACCINE FOR VIRAL HEPATITIS: ICD-10-CM

## 2018-04-19 DIAGNOSIS — R00.0 TACHYCARDIA: ICD-10-CM

## 2018-04-19 DIAGNOSIS — Z23 NEED FOR HEPATITIS B VACCINATION: ICD-10-CM

## 2018-04-19 DIAGNOSIS — Z23 NEED FOR HEPATITIS A IMMUNIZATION: ICD-10-CM

## 2018-04-19 DIAGNOSIS — I10 BENIGN ESSENTIAL HYPERTENSION: Primary | ICD-10-CM

## 2018-04-19 DIAGNOSIS — R31.29 MICROSCOPIC HEMATURIA: ICD-10-CM

## 2018-04-19 PROCEDURE — 90472 IMMUNIZATION ADMIN EACH ADD: CPT | Performed by: FAMILY MEDICINE

## 2018-04-19 PROCEDURE — 99214 OFFICE O/P EST MOD 30 MIN: CPT | Mod: 25 | Performed by: FAMILY MEDICINE

## 2018-04-19 PROCEDURE — 90632 HEPA VACCINE ADULT IM: CPT | Performed by: FAMILY MEDICINE

## 2018-04-19 PROCEDURE — 90471 IMMUNIZATION ADMIN: CPT | Performed by: FAMILY MEDICINE

## 2018-04-19 PROCEDURE — 93000 ELECTROCARDIOGRAM COMPLETE: CPT | Performed by: FAMILY MEDICINE

## 2018-04-19 PROCEDURE — 90746 HEPB VACCINE 3 DOSE ADULT IM: CPT | Performed by: FAMILY MEDICINE

## 2018-04-19 RX ORDER — LISINOPRIL 10 MG/1
10 TABLET ORAL DAILY
Qty: 90 TABLET | Refills: 3 | Status: SHIPPED | OUTPATIENT
Start: 2018-04-19 | End: 2019-05-23

## 2018-04-19 ASSESSMENT — PAIN SCALES - GENERAL: PAINLEVEL: NO PAIN (0)

## 2018-04-19 NOTE — PROGRESS NOTES
SUBJECTIVE:   Ruth Resendez is a 52 year old female who presents to clinic today for the following health issues:      Hypertension Follow-up      Outpatient blood pressures are being checked at home.  Results are ranging around 125/75.    Low Salt Diet: low salt    Amount of exercise or physical activity: 4-5 days/week for an average of 45-60 minutes    Problems taking medications regularly: No    Medication side effects: none    Diet: low salt    obgyn has requested labs all be ordered by me.    Exercising more often- high intensity interval training. She has noticed heart rate is elevated with exercise. According to her apple watch her resting heart rate is around 80. She is occasionally feeling lightheaded with exercise.   -pt has also had more aches/pains with increased amount of exercise. She will take 2 weeks off thinking pain has improved, but once she returns to exercise she notices the pain again. Pain has not worsened over time.   -she is frustrated she is not losing weight with increased exercise. She eats a relatively healthy diet, but refuses to give up wine and cheese.  Denies: sob, feeling winded, cp  Wt Readings from Last 5 Encounters:   04/19/18 70.8 kg (156 lb)   02/22/18 70.8 kg (156 lb)   02/16/17 69.9 kg (154 lb)   10/02/14 66.7 kg (147 lb)   02/03/17 71.4 kg (157 lb 8 oz)     BP:  No concerns.  BP Readings from Last 3 Encounters:   04/19/18 104/62   03/26/18 103/65   02/22/18 110/60     GI: GERD is overall well controlled but thinks it flares with onions and wine.    Past/recent records reviewed and discussed for --   Colonoscopy completed 3/2018. Return for f/u in 5 years.  Mammogram completed 2/2018  HIV screening completed with [facility]- negative    Pt plans to travel to [place] in August and would like to discuss necessary vaccines.      Problem list and histories reviewed & adjusted, as indicated.  Additional history: as documented    Patient Active Problem List   Diagnosis      Hypertension goal BP (blood pressure) < 140/90     CARDIOVASCULAR SCREENING; LDL GOAL LESS THAN 160     GERD (gastroesophageal reflux disease)     Rhinitis     Microscopic hematuria     Past Surgical History:   Procedure Laterality Date     AS ENLARGE BREAST WITH IMPLANT       COLONOSCOPY WITH CO2 INSUFFLATION N/A 3/26/2018    Procedure: COLONOSCOPY WITH CO2 INSUFFLATION;  Screen for colon cancer  BMI- 27.91  referred by: Renee Mendez MD  [facility] Fax# [phone]  Colonoscopy;  Surgeon: Michi Fernandez MD;  Location:  OR     SURGICAL HISTORY OF -       eye surgery as a child following a sledding injury       Social History   Substance Use Topics     Smoking status: Never Smoker     Smokeless tobacco: Never Used     Alcohol use 0.0 oz/week     0 Standard drinks or equivalent per week      Comment: 4 glasses wine per week.      Family History   Problem Relation Age of Onset     Respiratory Father      lung disease, former smoker, fatal age histiocystosis, lawn chem,  age 62     CANCER Paternal Grandmother      throat cancer     Breast Cancer Other      Cancer - colorectal Other      uncle (I didn't ask whether maternal or paternal), fatal in 60s     Neurologic Disorder Mother      parkinsons onset at 66     Asthma No family hx of      C.A.D. No family hx of      DIABETES No family hx of      Hypertension No family hx of      CEREBROVASCULAR DISEASE No family hx of      Prostate Cancer No family hx of          Current Outpatient Prescriptions   Medication Sig Dispense Refill     lisinopril (PRINIVIL/ZESTRIL) 10 MG tablet Take 1 tablet (10 mg) by mouth daily 90 tablet 3     lisinopril (PRINIVIL/ZESTRIL) 20 MG tablet Take 1 tablet (20 mg) by mouth daily 30 tablet 0     No Known Allergies    Reviewed and updated as needed this visit by clinical staff  Tobacco  Allergies  Meds  Med Hx  Surg Hx  Fam Hx  Soc Hx      Reviewed and updated as needed this visit by Provider Tobacco   "Allergies  Meds  Med Hx  Surg Hx  Fam Hx  Soc Hx            ROS:  Constitutional, HEENT, cardiovascular, pulmonary, gi and gu systems are negative, except as otherwise noted.    This document serves as a record of the services and decisions personally performed and made by Renee Mendez MD. It was created on her behalf by Melany Peacock, a trained medical scribe. The creation of this document is based the provider's statements to the medical scribe.  Melany Peacock April 19, 2018 11:00 AM      OBJECTIVE:     /62 (BP Location: Right arm, Patient Position: Sitting, Cuff Size: Adult Regular)  Pulse 102  Temp 98.2  F (36.8  C) (Oral)  Resp 18  Ht 1.6 m (5' 3\")  Wt 70.8 kg (156 lb)  LMP 04/10/2018  SpO2 97%  BMI 27.63 kg/m2  Body mass index is 27.63 kg/(m^2).  GENERAL: healthy, alert and no distress, overweight   NECK: no adenopathy, no asymmetry, masses, or scars and thyroid normal to palpation  RESP: lungs clear to auscultation - no rales, rhonchi or wheezes  CV: regular rate and rhythm, normal S1 S2, no S3 or S4, no murmur, click or rub, no peripheral edema and peripheral pulses strong  SKIN: no suspicious lesions or rashes to visible skin  PSYCH: mentation appears normal, affect normal/bright    Diagnostic Test Results:    EKG - Normal Sinus Rhythm, normal axis, normal intervals, no acute ST/T changes c/w ischemia, no LVH by voltage criteria, unchanged from previous tracings      Component      Latest Ref Rng & Units 8/25/2014 12/31/2015 8/25/2016   Sodium      133 - 144 mmol/L 138 140 138   Potassium      3.4 - 5.3 mmol/L 4.0 3.9 4.1   Chloride      94 - 109 mmol/L 107 105 104   Carbon Dioxide      20 - 32 mmol/L 25 27 27   Anion Gap      3 - 14 mmol/L 6 8 7   Glucose      70 - 99 mg/dL 94 90 95   Urea Nitrogen      7 - 30 mg/dL 11 12 14   Creatinine      0.52 - 1.04 mg/dL 0.74 0.66 0.67   GFR Estimate      >60 mL/min/1.7m2 84 >90 . . . >90 . . .   GFR Estimate If Black      >60 " mL/min/1.7m2 >90 . . . >90 . . . >90 . . .   Calcium      8.5 - 10.1 mg/dL 8.7 8.4 (L) 8.8   Cholesterol      <200 mg/dL 192     Triglycerides      0 - 150 mg/dL 143     HDL Cholesterol      >50 mg/dL 65     LDL Cholesterol Calculated      0 - 129 mg/dL 98     VLDL-Cholesterol      0 - 30 mg/dL 29     Cholesterol/HDL Ratio      0.0 - 5.0 3.0     Creatinine Urine      mg/dL      Albumin Urine mg/L      mg/L      Albumin Urine mg/g Cr      0 - 25 mg/g Cr      TSH      0.40 - 4.00 mU/L  1.26      Component      Latest Ref Rng & Units 2/3/2017   Sodium      133 - 144 mmol/L 141   Potassium      3.4 - 5.3 mmol/L 4.0   Chloride      94 - 109 mmol/L 102   Carbon Dioxide      20 - 32 mmol/L 28   Anion Gap      3 - 14 mmol/L 11   Glucose      70 - 99 mg/dL 94   Urea Nitrogen      7 - 30 mg/dL 14   Creatinine      0.52 - 1.04 mg/dL 0.82   GFR Estimate      >60 mL/min/1.7m2 74   GFR Estimate If Black      >60 mL/min/1.7m2 89   Calcium      8.5 - 10.1 mg/dL 8.8   Cholesterol      <200 mg/dL    Triglycerides      0 - 150 mg/dL    HDL Cholesterol      >50 mg/dL    LDL Cholesterol Calculated      0 - 129 mg/dL    VLDL-Cholesterol      0 - 30 mg/dL    Cholesterol/HDL Ratio      0.0 - 5.0    Creatinine Urine      mg/dL 91   Albumin Urine mg/L      mg/L <5   Albumin Urine mg/g Cr      0 - 25 mg/g Cr Unable to calculate due to low value   TSH      0.40 - 4.00 mU/L 1.56     ASSESSMENT/PLAN:     1. Benign essential hypertension   given BP is low normal and she has had some dizziness, trial decreasing lisinopril to 10 mg. Pt is to monitor BP and heart rate. Reviewed timing of taking, onset, benefits, monitoring and typicall and severe AE of the medication.   - Lipid panel reflex to direct LDL Fasting  - lisinopril (PRINIVIL/ZESTRIL) 10 MG tablet; Take 1 tablet (10 mg) by mouth daily  Dispense: 90 tablet; Refill: 3    2. Tachycardia   resting heart rate around 80 bpm per pt. Will trial decreasing lisinopril in hopes this will help. EKG  normal. F/u if persistent. Consider further eval if ongoing.   - EKG 12-lead complete w/read - Clinics  - TSH with free T4 reflex  - Basic metabolic panel  - CBC with platelets differential    3. Vaccine for viral hepatitis  - HEPATITIS A VACCINE (ADULT); Standing  - HEPATITIS B VACCINE, ADULT, IM; Standing  rec travel clinic for other [place] vaccines/travel info    4. Microscopic hematuria  - UA reflex to Microscopic and Culture    Patient Instructions   Decrease lisinopril to 10 mg daily. If heart rate continues to be elevated get rechecked. If blood pressure is still low in 2-3 weeks message me.   Goal BP <140/90  Ideal BP <120/80    Return to the clinic for lab only appointment- fasting labs (10-12 hours no food or drinks except water).     For heart health: goal of 150 minutes per week of cardio. Also include strength training such as yoga, pilates, weights, etc     Return in 2 months and again in 6 months for nurse only visit to receive vaccines.    Call your insurance to discuss coverage of Shingrix (shingles vaccine). Return for nurse only visit if you are covered.        The information in this document, created by the medical scribe for me, accurately reflects the services I personally performed and the decisions made by me. I have reviewed and approved this document for accuracy.   MD Renee Duffy MD  [facility]

## 2018-04-19 NOTE — MR AVS SNAPSHOT
After Visit Summary   4/19/2018    Ruth Resendez    MRN: [id number]           Patient Information     Date Of Birth          1965        Visit Information        Provider Department      4/19/2018 10:40 AM Renee Mendez MD [facility]        Today's Diagnoses     Benign essential hypertension    -  1    Tachycardia        Vaccine for viral hepatitis        Microscopic hematuria          Care Instructions    Decrease lisinopril to 10 mg daily. If heart rate continues to be elevated get rechecked. If blood pressure is still low in 2-3 weeks message me.   Goal BP <140/90  Ideal BP <120/80    Return to the clinic for lab only appointment- fasting labs (10-12 hours no food or drinks except water).     For heart health: goal of 150 minutes per week of cardio. Also include strength training such as yoga, pilates, weights, etc     Return in 2 months and again in 6 months for nurse only visit to receive vaccines.    Call your insurance to discuss coverage of Shingrix (shingles vaccine). Return for nurse only visit if you are covered.            Follow-ups after your visit        Future tests that were ordered for you today     Open Standing Orders        Priority Remaining Interval Expires Ordered    HEPATITIS A VACCINE (ADULT) Routine 2/2 4/19/2019 4/19/2018    HEPATITIS B VACCINE, ADULT, IM Routine 3/3 routine vaccine series 4/19/2019 4/19/2018          Open Future Orders        Priority Expected Expires Ordered    UA reflex to Microscopic and Culture Routine  4/19/2019 4/19/2018    CBC with platelets differential Routine  4/19/2019 4/19/2018    Basic metabolic panel Routine  4/19/2019 4/19/2018    TSH with free T4 reflex Routine  4/19/2019 4/19/2018    Lipid panel reflex to direct LDL Fasting Routine  4/19/2019 4/19/2018            Who to contact     If you have questions or need follow up information about today's clinic visit or your schedule please contact [facility] directly at [phone].  Normal or non-critical lab and imaging results will be communicated to you by MyChart, letter or phone within 4 business days after the clinic has received the results. If you do not hear from us within 7 days, please contact the clinic through [facility] or phone. If you have a critical or abnormal lab result, we will notify you by phone as soon as possible.  Submit refill requests through [employer] or call your pharmacy and they will forward the refill request to us. Please allow 3 business days for your refill to be completed.          Additional Information About Your Visit        [facility] Information     [employer] gives you secure access to your electronic health record. If you see a primary care provider, you can also send messages to your care team and make appointments. If you have questions, please call your primary care clinic.  If you do not have a primary care provider, please call [phone] and they will assist you.        Care EveryWhere ID     This is your Care EveryWhere ID. This could be used by other organizations to access your [place] medical records  [id number]        Your Vitals Were     Pulse Temperature Respirations Height Last Period Pulse Oximetry    102 98.2  F (36.8  C) (Oral) 18 1.6 m (5' 3\") 04/10/2018 97%    BMI (Body Mass Index)                   27.63 kg/m2            Blood Pressure from Last 3 Encounters:   04/19/18 104/62   03/26/18 103/65   02/22/18 110/60    Weight from Last 3 Encounters:   04/19/18 70.8 kg (156 lb)   02/22/18 70.8 kg (156 lb)   02/16/17 69.9 kg (154 lb)              We Performed the Following     EKG 12-lead complete w/read - Clinics          Today's Medication Changes          These changes are accurate as of 4/19/18 11:28 AM.  If you have any questions, ask your nurse or doctor.               These medicines have changed or have updated prescriptions.        Dose/Directions    * lisinopril 20 MG tablet   Commonly " known as:  PRINIVIL/ZESTRIL   This may have changed:  Another medication with the same name was added. Make sure you understand how and when to take each.   Used for:  Benign essential hypertension   Changed by:  Renee Mendez MD        Dose:  20 mg   Take 1 tablet (20 mg) by mouth daily   Quantity:  30 tablet   Refills:  0       * lisinopril 10 MG tablet   Commonly known as:  PRINIVIL/ZESTRIL   This may have changed:  You were already taking a medication with the same name, and this prescription was added. Make sure you understand how and when to take each.   Used for:  Benign essential hypertension   Changed by:  Renee Mendez MD        Dose:  10 mg   Take 1 tablet (10 mg) by mouth daily   Quantity:  90 tablet   Refills:  3       * Notice:  This list has 2 medication(s) that are the same as other medications prescribed for you. Read the directions carefully, and ask your doctor or other care provider to review them with you.         Where to get your medicines      These medications were sent to [facility] - [address] [address]    Hours:  Mon-Fri 8:30am-5:00pm Toll Free [phone] Phone:  [phone]     lisinopril 10 MG tablet                Primary Care Provider Office Phone # Fax #    Renee Mendez [phone] [address]  [facility] [address]        Equal Access to Services     [facility]: Hadii mary ku hadasho Soomaali, waaxda luqadaha, qaybta kaalmada adeegyada, waxay ila arshad . So [facility] [phone].    ATENCIÓN: Si maurola favian, tiene a correa disposición servicios gratuitos de asistencia lingüística. Llame al [phone].    We comply with applicable federal civil rights laws and [place] laws. We do not discriminate on the basis of race, color, national origin, age, disability, sex, sexual orientation, or gender  identity.            Thank you!     Thank you for choosing [facility]  for your care. Our goal is always to provide you with excellent care. Hearing back from our patients is one way we can continue to improve our services. Please take a few minutes to complete the written survey that you may receive in the mail after your visit with us. Thank you!             Your Updated Medication List - Protect others around you: Learn how to safely use, store and throw away your medicines at www.disposemymeds.org.          This list is accurate as of 4/19/18 11:28 AM.  Always use your most recent med list.                   Brand Name Dispense Instructions for use Diagnosis    * lisinopril 20 MG tablet    PRINIVIL/ZESTRIL    30 tablet    Take 1 tablet (20 mg) by mouth daily    Benign essential hypertension       * lisinopril 10 MG tablet    PRINIVIL/ZESTRIL    90 tablet    Take 1 tablet (10 mg) by mouth daily    Benign essential hypertension       * Notice:  This list has 2 medication(s) that are the same as other medications prescribed for you. Read the directions carefully, and ask your doctor or other care provider to review them with you.

## 2018-04-19 NOTE — PATIENT INSTRUCTIONS
Decrease lisinopril to 10 mg daily. If heart rate continues to be elevated get rechecked. If blood pressure is still low in 2-3 weeks message me.   Goal BP <140/90  Ideal BP <120/80    Return to the clinic for lab only appointment- fasting labs (10-12 hours no food or drinks except water).     For heart health: goal of 150 minutes per week of cardio. Also include strength training such as yoga, pilates, weights, etc     Return in 2 months and again in 6 months for nurse only visit to receive vaccines.    Call your insurance to discuss coverage of Shingrix (shingles vaccine). Return for nurse only visit if you are covered.

## 2018-04-19 NOTE — Clinical Note
Please document her vaccines if given. If not given, please remove the released order.  Close chart after either. Thanks!

## 2018-04-20 ENCOUNTER — MYC MEDICAL ADVICE (OUTPATIENT)
Dept: FAMILY MEDICINE | Facility: CLINIC | Age: 53
End: 2018-04-20

## 2018-04-20 NOTE — NURSING NOTE
Screening Questionnaire for Adult Immunization    Are you sick today?   No   Do you have allergies to medications, food, a vaccine component or latex?   No   Have you ever had a serious reaction after receiving a vaccination?   No   Do you have a long-term health problem with heart disease, lung disease, asthma, kidney disease, metabolic disease (e.g. diabetes), anemia, or other blood disorder?   No   Do you have cancer, leukemia, HIV/AIDS, or any other immune system problem?   No   In the past 3 months, have you taken medications that affect  your immune system, such as prednisone, other steroids, or anticancer drugs; drugs for the treatment of rheumatoid arthritis, Crohn s disease, or psoriasis; or have you had radiation treatments?   No   Have you had a seizure, or a brain or other nervous system problem?   No   During the past year, have you received a transfusion of blood or blood     products, or been given immune (gamma) globulin or antiviral drug?   No   For women: Are you pregnant or is there a chance you could become        pregnant during the next month?   No   Have you received any vaccinations in the past 4 weeks?   No     Immunization questionnaire answers were all negative.        Patient instructed to remain in clinic for 15 minutes afterwards, and to report any adverse reaction to me immediately.       Screening performed by Jovany Cobos on 4/20/2018 at 11:50 AM.

## 2018-05-03 DIAGNOSIS — I10 BENIGN ESSENTIAL HYPERTENSION: ICD-10-CM

## 2018-05-03 DIAGNOSIS — R31.29 MICROSCOPIC HEMATURIA: ICD-10-CM

## 2018-05-03 LAB
ALBUMIN UR-MCNC: NEGATIVE MG/DL
ANION GAP SERPL CALCULATED.3IONS-SCNC: 7 MMOL/L (ref 3–14)
APPEARANCE UR: CLEAR
BACTERIA #/AREA URNS HPF: ABNORMAL /HPF
BASOPHILS # BLD AUTO: 0 10E9/L (ref 0–0.2)
BASOPHILS NFR BLD AUTO: 0.1 %
BILIRUB UR QL STRIP: NEGATIVE
BUN SERPL-MCNC: 11 MG/DL (ref 7–30)
CALCIUM SERPL-MCNC: 9 MG/DL (ref 8.5–10.1)
CHLORIDE SERPL-SCNC: 105 MMOL/L (ref 94–109)
CHOLEST SERPL-MCNC: 185 MG/DL
CO2 SERPL-SCNC: 27 MMOL/L (ref 20–32)
COLOR UR AUTO: YELLOW
CREAT SERPL-MCNC: 0.73 MG/DL (ref 0.52–1.04)
DIFFERENTIAL METHOD BLD: NORMAL
EOSINOPHIL # BLD AUTO: 0.1 10E9/L (ref 0–0.7)
EOSINOPHIL NFR BLD AUTO: 1.7 %
ERYTHROCYTE [DISTWIDTH] IN BLOOD BY AUTOMATED COUNT: 13.5 % (ref 10–15)
GFR SERPL CREATININE-BSD FRML MDRD: 84 ML/MIN/1.7M2
GLUCOSE SERPL-MCNC: 94 MG/DL (ref 70–99)
GLUCOSE UR STRIP-MCNC: NEGATIVE MG/DL
HCT VFR BLD AUTO: 41.1 % (ref 35–47)
HDLC SERPL-MCNC: 72 MG/DL
HGB BLD-MCNC: 14.3 G/DL (ref 11.7–15.7)
HGB UR QL STRIP: NEGATIVE
KETONES UR STRIP-MCNC: NEGATIVE MG/DL
LDLC SERPL CALC-MCNC: 96 MG/DL
LEUKOCYTE ESTERASE UR QL STRIP: ABNORMAL
LYMPHOCYTES # BLD AUTO: 2.6 10E9/L (ref 0.8–5.3)
LYMPHOCYTES NFR BLD AUTO: 35.9 %
MCH RBC QN AUTO: 31.4 PG (ref 26.5–33)
MCHC RBC AUTO-ENTMCNC: 34.8 G/DL (ref 31.5–36.5)
MCV RBC AUTO: 90 FL (ref 78–100)
MONOCYTES # BLD AUTO: 0.5 10E9/L (ref 0–1.3)
MONOCYTES NFR BLD AUTO: 6.7 %
MUCOUS THREADS #/AREA URNS LPF: PRESENT /LPF
NEUTROPHILS # BLD AUTO: 4 10E9/L (ref 1.6–8.3)
NEUTROPHILS NFR BLD AUTO: 55.6 %
NITRATE UR QL: NEGATIVE
NON-SQ EPI CELLS #/AREA URNS LPF: ABNORMAL /LPF
NONHDLC SERPL-MCNC: 113 MG/DL
PH UR STRIP: 7 PH (ref 5–7)
PLATELET # BLD AUTO: 280 10E9/L (ref 150–450)
POTASSIUM SERPL-SCNC: 4.1 MMOL/L (ref 3.4–5.3)
RBC # BLD AUTO: 4.55 10E12/L (ref 3.8–5.2)
RBC #/AREA URNS AUTO: ABNORMAL /HPF
SODIUM SERPL-SCNC: 139 MMOL/L (ref 133–144)
SOURCE: ABNORMAL
SP GR UR STRIP: 1.02 (ref 1–1.03)
TRIGL SERPL-MCNC: 84 MG/DL
TSH SERPL DL<=0.005 MIU/L-ACNC: 1.73 MU/L (ref 0.4–4)
UROBILINOGEN UR STRIP-ACNC: 0.2 EU/DL (ref 0.2–1)
WBC # BLD AUTO: 7.2 10E9/L (ref 4–11)
WBC #/AREA URNS AUTO: ABNORMAL /HPF

## 2018-05-03 PROCEDURE — 36415 COLL VENOUS BLD VENIPUNCTURE: CPT | Performed by: FAMILY MEDICINE

## 2018-05-03 PROCEDURE — 80048 BASIC METABOLIC PNL TOTAL CA: CPT | Performed by: FAMILY MEDICINE

## 2018-05-03 PROCEDURE — 85025 COMPLETE CBC W/AUTO DIFF WBC: CPT | Performed by: FAMILY MEDICINE

## 2018-05-03 PROCEDURE — 84443 ASSAY THYROID STIM HORMONE: CPT | Performed by: FAMILY MEDICINE

## 2018-05-03 PROCEDURE — 81001 URINALYSIS AUTO W/SCOPE: CPT | Performed by: FAMILY MEDICINE

## 2018-05-03 PROCEDURE — 80061 LIPID PANEL: CPT | Performed by: FAMILY MEDICINE

## 2018-07-25 ENCOUNTER — MYC MEDICAL ADVICE (OUTPATIENT)
Dept: FAMILY MEDICINE | Facility: CLINIC | Age: 53
End: 2018-07-25

## 2018-08-01 ENCOUNTER — RADIANT APPOINTMENT (OUTPATIENT)
Dept: GENERAL RADIOLOGY | Facility: CLINIC | Age: 53
End: 2018-08-01
Attending: FAMILY MEDICINE
Payer: COMMERCIAL

## 2018-08-01 ENCOUNTER — OFFICE VISIT (OUTPATIENT)
Dept: FAMILY MEDICINE | Facility: CLINIC | Age: 53
End: 2018-08-01
Payer: COMMERCIAL

## 2018-08-01 VITALS
DIASTOLIC BLOOD PRESSURE: 72 MMHG | OXYGEN SATURATION: 100 % | SYSTOLIC BLOOD PRESSURE: 110 MMHG | HEART RATE: 92 BPM | TEMPERATURE: 97.6 F | WEIGHT: 157 LBS | HEIGHT: 63 IN | BODY MASS INDEX: 27.82 KG/M2

## 2018-08-01 DIAGNOSIS — R06.09 DOE (DYSPNEA ON EXERTION): Primary | ICD-10-CM

## 2018-08-01 DIAGNOSIS — R07.89 CHEST PRESSURE: ICD-10-CM

## 2018-08-01 DIAGNOSIS — R06.09 DOE (DYSPNEA ON EXERTION): ICD-10-CM

## 2018-08-01 PROCEDURE — 99214 OFFICE O/P EST MOD 30 MIN: CPT | Performed by: FAMILY MEDICINE

## 2018-08-01 PROCEDURE — 71046 X-RAY EXAM CHEST 2 VIEWS: CPT | Mod: FY

## 2018-08-01 NOTE — MR AVS SNAPSHOT
After Visit Summary   8/1/2018    Ruth Resendez    MRN: [id number]           Patient Information     Date Of Birth          1965        Visit Information        Provider Department      8/1/2018 5:20 PM Renee Mendez MD [facility]        Today's Diagnoses     MENDES (dyspnea on exertion)    -  1    Chest pressure          Care Instructions    Please call [facility] (formerly called [facility]) at [phone] to schedule stress test.                    Costochondritis (Chest Wall Pain)  Information About Your Condition:  Description  Costochondritis is inflammation of the joint between a rib and the breastbone (sternum) or between the bony part of the rib and the rib cartilage. Cartilage is a tough rubbery tissue that lines and cushions the surfaces of joints. The pain is most often on the left side of your chest, but can be on either side. Your healthcare provider might refer to costochondritis by other names, including chest wall pain, costosternal syndrome and costosternal chondrodynia. When the pain of costochondritis is accompanied by swelling it is called Tietze's syndrome. Costochondritis is more common in women than men. It tends to occur more often in people 12 to 14 years old or over 40 years old.   Symptoms  pain or tenderness to touch in the front of the chest near the breastbone   sharp pain when taking a deep breath, coughing, moving a certain way, or when pressing on it   trouble taking a deep breath   Causes  Sometimes costochondritis is caused by an injury to the chest, such as falling or getting hit by something in the chest. It can also be caused by an infection, such as a cold or the flu. Many times the cause cannot be found.   What You Should Do At Home (Follow-up Care)   Avoid activities or movement that makes the pain worse.   Sometimes heat makes the pain better. A heating pad on the lowest  setting can be put on the area for 20 minutes 4 to 8 times a day.   When the pain is gone, go back to your normal activities slowly.   Do gentle stretching exercises, but do not do vigorous exercise.   Acetaminophen (Tylenol ) or over-the-counter anti-inflammatory medicine such as ibuprofen (Motrin , Advil ) or naproxen (Aleve , Naprosyn ) may help decrease your pain. You should not take ibuprofen or naproxen if you have a history of bleeding in your stomach.   If you were given a prescription, be sure to get it filled right away. Take the medicine exactly as prescribed. If you do not think it is helping, call your healthcare provider. Do not increase how much you take or how often you take it without talking to your healthcare provider first.   If the healthcare provider prescribes pain medicine that makes you tired, or sleepy, or contains narcotics, you should not drink alcohol, including beer and wine, drive, or participate in any other activities that you need to be clear-headed for.   Please keep all medicines out of the reach of children.   What You Can Do Stay Healthy  Be sure to stretch and warm up properly before you start any strenuous exercise or activity.   Care Alerts  Call Your Healthcare Provider Right Away Or Return To The Emergency Department If:  You have a fever higher than 101.5  F (38.6  C) orally.   You start to have a cough with yellowish or greenish phlegm (mucus.)   There are streaks of blood in the phlegm you are coughing up.   You have any symptoms that worry you.                   Follow-ups after your visit        Future tests that were ordered for you today     Open Future Orders        Priority Expected Expires Ordered    XR Chest 2 Views Routine 8/1/2018 8/1/2019 8/1/2018    Exercise Stress Echocardiogram Routine  8/1/2019 8/1/2018            Who to contact     If you have questions or need follow up information about today's clinic visit or your schedule please contact [facility] directly at [phone].  Normal or non-critical lab and imaging results will be communicated to you by MyChart, letter or phone within 4 business days after the clinic has received the results. If you do not hear from us within 7 days, please contact the clinic through [employer] or phone. If you have a critical or abnormal lab result, we will notify you by phone as soon as possible.  Submit refill requests through [employer] or call your pharmacy and they will forward the refill request to us. Please allow 3 business days for your refill to be completed.          Additional Information About Your Visit        [employer] Information     [employer] gives you secure access to your electronic health record. If you see a primary care provider, you can also send messages to your care team and make appointments. If you have questions, please call your primary care clinic.  If you do not have a primary care provider, please call [phone] and they will assist you.        Care EveryWhere ID     This is your Care EveryWhere ID. This could be used by other organizations to access your [facility] medical records  [id number]        Your Vitals Were     Pulse Temperature Height Last Period Pulse Oximetry BMI (Body Mass Index)    92 97.6  F (36.4  C) (Oral) 1.6 m (5' 3\") 07/01/2018 100% 27.81 kg/m2       Blood Pressure from Last 3 Encounters:   08/01/18 110/72   04/19/18 104/62   03/26/18 103/65    Weight from Last 3 Encounters:   08/01/18 71.2 kg (157 lb)   04/19/18 70.8 kg (156 lb)   02/22/18 70.8 kg (156 lb)               Primary Care Provider Office Phone # Fax #    Renee Mendez [phone] [address]  [facility] [address]        Equal Access to Services     [facility] RICKI : Emilee Torre, wamichellda luqadaha, qaybta kaalmada ari, adamaris bullock. [facility] [phone].    ATENCIÓN: Si habla español, tiene a correa disposición " servicios gratuitos de asistencia lingüística. Arvind lyles [phone].    We comply with applicable federal civil rights laws and [place] laws. We do not discriminate on the basis of race, color, national origin, age, disability, sex, sexual orientation, or gender identity.            Thank you!     Thank you for choosing [facility]  for your care. Our goal is always to provide you with excellent care. Hearing back from our patients is one way we can continue to improve our services. Please take a few minutes to complete the written survey that you may receive in the mail after your visit with us. Thank you!             Your Updated Medication List - Protect others around you: Learn how to safely use, store and throw away your medicines at www.disposemymeds.org.          This list is accurate as of 8/1/18  5:59 PM.  Always use your most recent med list.                   Brand Name Dispense Instructions for use Diagnosis    lisinopril 10 MG tablet    PRINIVIL/ZESTRIL    90 tablet    Take 1 tablet (10 mg) by mouth daily    Benign essential hypertension

## 2018-08-01 NOTE — PATIENT INSTRUCTIONS
Please call [facility] (formerly called [facility]) at [phone] to schedule stress test.                    Costochondritis (Chest Wall Pain)  Information About Your Condition:  Description  Costochondritis is inflammation of the joint between a rib and the breastbone (sternum) or between the bony part of the rib and the rib cartilage. Cartilage is a tough rubbery tissue that lines and cushions the surfaces of joints. The pain is most often on the left side of your chest, but can be on either side. Your healthcare provider might refer to costochondritis by other names, including chest wall pain, costosternal syndrome and costosternal chondrodynia. When the pain of costochondritis is accompanied by swelling it is called Tietze's syndrome. Costochondritis is more common in women than men. It tends to occur more often in people 12 to 14 years old or over 40 years old.   Symptoms  pain or tenderness to touch in the front of the chest near the breastbone   sharp pain when taking a deep breath, coughing, moving a certain way, or when pressing on it   trouble taking a deep breath   Causes  Sometimes costochondritis is caused by an injury to the chest, such as falling or getting hit by something in the chest. It can also be caused by an infection, such as a cold or the flu. Many times the cause cannot be found.   What You Should Do At Home (Follow-up Care)   Avoid activities or movement that makes the pain worse.   Sometimes heat makes the pain better. A heating pad on the lowest setting can be put on the area for 20 minutes 4 to 8 times a day.   When the pain is gone, go back to your normal activities slowly.   Do gentle stretching exercises, but do not do vigorous exercise.   Acetaminophen (Tylenol ) or over-the-counter anti-inflammatory medicine such as ibuprofen (Motrin , Advil ) or naproxen (Aleve , Naprosyn ) may help decrease your pain. You should not take ibuprofen  or naproxen if you have a history of bleeding in your stomach.   If you were given a prescription, be sure to get it filled right away. Take the medicine exactly as prescribed. If you do not think it is helping, call your healthcare provider. Do not increase how much you take or how often you take it without talking to your healthcare provider first.   If the healthcare provider prescribes pain medicine that makes you tired, or sleepy, or contains narcotics, you should not drink alcohol, including beer and wine, drive, or participate in any other activities that you need to be clear-headed for.   Please keep all medicines out of the reach of children.   What You Can Do Stay Healthy  Be sure to stretch and warm up properly before you start any strenuous exercise or activity.   Care Alerts  Call Your Healthcare Provider Right Away Or Return To The Emergency Department If:  You have a fever higher than 101.5  F (38.6  C) orally.   You start to have a cough with yellowish or greenish phlegm (mucus.)   There are streaks of blood in the phlegm you are coughing up.   You have any symptoms that worry you.

## 2018-08-01 NOTE — PROGRESS NOTES
SUBJECTIVE:   Ruth Resendez is a 52 year old female who presents to clinic today for the following health issues:      Concern - Shortness of breath   Onset: ongoing since April 2018    Description:   Unable to catch a full breath, panting after a short distance of walking, feel chest flutters (not frequent).     Intensity: Denies pain     Progression of Symptoms:  intermittent    Accompanying Signs & Symptoms:  Heart rate up to 100 by just sitting and being relax.     Reviewed last visit from 4/19/18- had reported no dyspnea, but notes after thinking about her sx's she has had intermittent dyspnea. She's not sure if noticing this is from thinking about it more.     Noting with exertional household activities she will have dyspnea. Even her daughter notes she is getting winded easily with household taks.     Can still exercise without issue and doesn't feel like the ability to exercise over time has improved/worsened    Hx of anxiety. But doesn't think current sx's are causing her anxiety. Denies current anxiety sx's.     Finds her hr goes up above 100 at times of rest based on her apple watch.     No cough or wheeze.   Feel like chest is heavy/pressure at times even with non-exertional things.   At times mid back will also hurt.     Last visit, bp med (lisinopril)dose dropped to 10 mg due to some orthostatic dizziness. Does self increase dose to 20 mg when bp upper range of nl.   At times when she checks bp it will be up to 130/85 but never higher  Still feels intermittently light-headed with exercise. Deep breaths and stopping for few minutes does help this.    Reviewed patient's [employer] message she had sent to office 7/25/18:  Danial VALENTINE! I am wondering about my heart rate and how fast it beats. I have not seen any change since we lowered my dosage of lisinopril. My average heart rate is 78 bpm and my walking average is 110 bpm. Is this too high? I also am noticing that I am feeling like I cannot catch a  full breath at times and out of breath from walking up the stairs. I have had two friends pass away from heart attacks recently and one had 90% blockage and had no idea. I just want to be safe rather than sorry. As you know, I am a worrier by nature and this could be in my head, but...please let me know if we need to do anything. My brother is having a scan for both stroke and heart issues. Is this something that would benefit me?     Thanks!     bernard   Problem list and histories reviewed & adjusted, as indicated.  Additional history: as documented    Patient Active Problem List   Diagnosis     Hypertension goal BP (blood pressure) < 140/90     CARDIOVASCULAR SCREENING; LDL GOAL LESS THAN 160     GERD (gastroesophageal reflux disease)     Rhinitis     Microscopic hematuria     Past Surgical History:   Procedure Laterality Date     AS ENLARGE BREAST WITH IMPLANT       COLONOSCOPY WITH CO2 INSUFFLATION N/A 3/26/2018    Procedure: COLONOSCOPY WITH CO2 INSUFFLATION;  Screen for colon cancer  BMI- 27.91  referred by: Renee Mendez MD  [facility] Fax# [phone]  Colonoscopy;  Surgeon: Michi Fernandez MD;  Location:  OR     SURGICAL HISTORY OF -       eye surgery as a child following a sledding injury       Social History   Substance Use Topics     Smoking status: Never Smoker     Smokeless tobacco: Never Used     Alcohol use 0.0 oz/week     0 Standard drinks or equivalent per week      Comment: 4 glasses wine per week.      Family History   Problem Relation Age of Onset     Respiratory Father      lung disease, former smoker, fatal age histiocystosis, lawn chem,  age 62     Cancer Paternal Grandmother      throat cancer     Breast Cancer Other      Cancer - colorectal Other      uncle (I didn't ask whether maternal or paternal), fatal in 60s     Neurologic Disorder Mother      parkinsons onset at 66     Asthma No family hx of      C.A.D. No family hx of      Diabetes No family hx of       "Hypertension No family hx of      Cerebrovascular Disease No family hx of      Prostate Cancer No family hx of          Current Outpatient Prescriptions   Medication Sig Dispense Refill     lisinopril (PRINIVIL/ZESTRIL) 10 MG tablet Take 1 tablet (10 mg) by mouth daily 90 tablet 3     [DISCONTINUED] lisinopril (PRINIVIL/ZESTRIL) 20 MG tablet Take 1 tablet (20 mg) by mouth daily 30 tablet 0     No Known Allergies    Reviewed and updated as needed this visit by clinical staff  Tobacco  Allergies  Meds  Med Hx  Surg Hx  Fam Hx  Soc Hx      Reviewed and updated as needed this visit by Provider         ROS:  Constitutional, HEENT, cardiovascular, pulmonary, gi and gu systems are negative, except as otherwise noted.    OBJECTIVE:     /72 (BP Location: Right arm, Patient Position: Sitting, Cuff Size: Adult Large)  Pulse 92  Temp 97.6  F (36.4  C) (Oral)  Ht 1.6 m (5' 3\")  Wt 71.2 kg (157 lb)  LMP 07/01/2018  SpO2 100%  BMI 27.81 kg/m2  Body mass index is 27.81 kg/(m^2).  GENERAL: healthy, alert and no distress  NECK: no adenopathy, no asymmetry, masses, or scars and thyroid normal to palpation  RESP: lungs clear to auscultation - no rales, rhonchi or wheezes  Chest: tender along sternal boarder bilaterally  CV: regular rate and rhythm, normal S1 S2, no S3 or S4, no murmur, click or rub, no peripheral edema and peripheral pulses strong  ABDOMEN: soft, nontender, no hepatosplenomegaly, no masses and bowel sounds normal  MS: no gross musculoskeletal defects noted, no edema    Diagnostic Test Results:  CXR - negative for acute infiltrate or mass. Heart appears nl in size. very Mild hyperinflation?  Xray personally reviewed and evaluated by me     Reviewed ekg and labs from previous visit  Component      Latest Ref Rng & Units 5/3/2018   WBC      4.0 - 11.0 10e9/L 7.2   RBC Count      3.8 - 5.2 10e12/L 4.55   Hemoglobin      11.7 - 15.7 g/dL 14.3   Hematocrit      35.0 - 47.0 % 41.1   MCV      78 - 100 fl 90 "   MCH      26.5 - 33.0 pg 31.4   MCHC      31.5 - 36.5 g/dL 34.8   RDW      10.0 - 15.0 % 13.5   Platelet Count      150 - 450 10e9/L 280   Diff Method       Automated Method   % Neutrophils      % 55.6   % Lymphocytes      % 35.9   % Monocytes      % 6.7   % Eosinophils      % 1.7   % Basophils      % 0.1   Absolute Neutrophil      1.6 - 8.3 10e9/L 4.0   Absolute Lymphocytes      0.8 - 5.3 10e9/L 2.6   Absolute Monocytes      0.0 - 1.3 10e9/L 0.5   Absolute Eosinophils      0.0 - 0.7 10e9/L 0.1   Absolute Basophils      0.0 - 0.2 10e9/L 0.0   Color Urine       Yellow   Appearance Urine       Clear   Glucose Urine      NEG:Negative mg/dL Negative   Bilirubin Urine      NEG:Negative Negative   Ketones Urine      NEG:Negative mg/dL Negative   Specific Gravity Urine      1.003 - 1.035 1.020   Blood Urine      NEG:Negative Negative   pH Urine      5.0 - 7.0 pH 7.0   Protein Albumin Urine      NEG:Negative mg/dL Negative   Urobilinogen Urine      0.2 - 1.0 EU/dL 0.2   Nitrite Urine      NEG:Negative Negative   Leukocyte Esterase Urine      NEG:Negative Trace (A)   Source       Midstream Urine   Sodium      133 - 144 mmol/L 139   Potassium      3.4 - 5.3 mmol/L 4.1   Chloride      94 - 109 mmol/L 105   Carbon Dioxide      20 - 32 mmol/L 27   Anion Gap      3 - 14 mmol/L 7   Glucose      70 - 99 mg/dL 94   Urea Nitrogen      7 - 30 mg/dL 11   Creatinine      0.52 - 1.04 mg/dL 0.73   GFR Estimate      >60 mL/min/1.7m2 84   GFR Estimate If Black      >60 mL/min/1.7m2 >90   Calcium      8.5 - 10.1 mg/dL 9.0   Cholesterol      <200 mg/dL 185   Triglycerides      <150 mg/dL 84   HDL Cholesterol      >49 mg/dL 72   LDL Cholesterol Calculated      <100 mg/dL 96   Non HDL Cholesterol      <130 mg/dL 113   WBC Urine      OTO5:0 - 5 /HPF 5-10 (A)   RBC Urine      OTO2:O - 2 /HPF O - 2   Squamous Epithelial /LPF Urine      FEW:Few /LPF Few   Bacteria Urine      NEG:Negative /HPF Few (A)   Mucous Urine      NEG:Negative /LPF Present  (A)   TSH      0.40 - 4.00 mU/L 1.73     Final result (Resulted: 4/19/2018) Reviewed    Impression   Normal Sinus Rhythm, normal axis, normal intervals, no acute ST/T changes c/w ischemia, no LVH by voltage criteria, unchanged from previous tracings     HR was 77 with EKG   ASSESSMENT/PLAN:         ICD-10-CM    1. MENDES (dyspnea on exertion) R06.09 Exercise Stress Echocardiogram     XR Chest 2 Views   2. Chest pressure R07.89 Exercise Stress Echocardiogram     XR Chest 2 Views   she has had a nl ekg, cxr, oxygen saturation.   Given tenderness along costochondral junction I do think costochondritis playing role with her sx's somewhat- she does admit to doing a lot of upper body workouts/push ups. Reviewed mgmt of this.   Will get stress echo and take look at heart, HR with exercise  Consider also ziopatch and pft's if ongoing concerns.   Index of suspicion for PE low, given length of sx's (months), no extremity sx's, nl oxygenation, ability to exercise normally, nl ekg and cxr. Could consider CT chest if persistent sx's.     Patient Instructions   Please call [facility] (formerly called [facility]) at [phone] to schedule stress test.                    Costochondritis (Chest Wall Pain)  Information About Your Condition:  Description  Costochondritis is inflammation of the joint between a rib and the breastbone (sternum) or between the bony part of the rib and the rib cartilage. Cartilage is a tough rubbery tissue that lines and cushions the surfaces of joints. The pain is most often on the left side of your chest, but can be on either side. Your healthcare provider might refer to costochondritis by other names, including chest wall pain, costosternal syndrome and costosternal chondrodynia. When the pain of costochondritis is accompanied by swelling it is called Tietze's syndrome. Costochondritis is more common in women than men. It tends to occur more often in people  12 to 14 years old or over 40 years old.   Symptoms  pain or tenderness to touch in the front of the chest near the breastbone   sharp pain when taking a deep breath, coughing, moving a certain way, or when pressing on it   trouble taking a deep breath   Causes  Sometimes costochondritis is caused by an injury to the chest, such as falling or getting hit by something in the chest. It can also be caused by an infection, such as a cold or the flu. Many times the cause cannot be found.   What You Should Do At Home (Follow-up Care)   Avoid activities or movement that makes the pain worse.   Sometimes heat makes the pain better. A heating pad on the lowest setting can be put on the area for 20 minutes 4 to 8 times a day.   When the pain is gone, go back to your normal activities slowly.   Do gentle stretching exercises, but do not do vigorous exercise.   Acetaminophen (Tylenol ) or over-the-counter anti-inflammatory medicine such as ibuprofen (Motrin , Advil ) or naproxen (Aleve , Naprosyn ) may help decrease your pain. You should not take ibuprofen or naproxen if you have a history of bleeding in your stomach.   If you were given a prescription, be sure to get it filled right away. Take the medicine exactly as prescribed. If you do not think it is helping, call your healthcare provider. Do not increase how much you take or how often you take it without talking to your healthcare provider first.   If the healthcare provider prescribes pain medicine that makes you tired, or sleepy, or contains narcotics, you should not drink alcohol, including beer and wine, drive, or participate in any other activities that you need to be clear-headed for.   Please keep all medicines out of the reach of children.   What You Can Do Stay Healthy  Be sure to stretch and warm up properly before you start any strenuous exercise or activity.   Care Alerts  Call Your Healthcare Provider Right Away Or Return To The Emergency Department If:  You  have a fever higher than 101.5  F (38.6  C) orally.   You start to have a cough with yellowish or greenish phlegm (mucus.)   There are streaks of blood in the phlegm you are coughing up.   You have any symptoms that worry you.               Renee Mendez MD  [facility]

## 2018-08-14 ENCOUNTER — TELEPHONE (OUTPATIENT)
Dept: GENERAL RADIOLOGY | Facility: CLINIC | Age: 53
End: 2018-08-14

## 2018-08-14 NOTE — TELEPHONE ENCOUNTER
Called pt prior to stress echocardiogram, scheduled for 8/15/18 to discuss necessary preparation and to assess for any questions or concerns pt may have. Reminded pt to wear comfortable clothing, to take all medications as ordered, to remain NPO with the exception of water for 3 hours prior to test, and to remain free of caffeine or nicotine products for 12 hours prior to test. Pt verbalized understanding, and all questions answered to satisfaction.

## 2018-08-15 ENCOUNTER — RADIANT APPOINTMENT (OUTPATIENT)
Dept: CARDIOLOGY | Facility: CLINIC | Age: 53
End: 2018-08-15
Attending: FAMILY MEDICINE
Payer: COMMERCIAL

## 2018-08-15 VITALS — SYSTOLIC BLOOD PRESSURE: 119 MMHG | DIASTOLIC BLOOD PRESSURE: 84 MMHG | HEART RATE: 83 BPM

## 2018-08-15 DIAGNOSIS — R07.89 CHEST PRESSURE: ICD-10-CM

## 2018-08-15 DIAGNOSIS — R06.09 DOE (DYSPNEA ON EXERTION): ICD-10-CM

## 2018-08-15 PROCEDURE — 93017 CV STRESS TEST TRACING ONLY: CPT

## 2018-08-15 PROCEDURE — 93016 CV STRESS TEST SUPVJ ONLY: CPT

## 2018-08-15 PROCEDURE — 93350 STRESS TTE ONLY: CPT | Mod: TC

## 2018-08-15 PROCEDURE — 93325 DOPPLER ECHO COLOR FLOW MAPG: CPT | Mod: 26 | Performed by: INTERNAL MEDICINE

## 2018-08-15 PROCEDURE — 93321 DOPPLER ECHO F-UP/LMTD STD: CPT | Mod: TC

## 2018-08-15 PROCEDURE — 93321 DOPPLER ECHO F-UP/LMTD STD: CPT | Mod: 26 | Performed by: INTERNAL MEDICINE

## 2018-08-15 PROCEDURE — 93018 CV STRESS TEST I&R ONLY: CPT | Performed by: INTERNAL MEDICINE

## 2018-08-15 PROCEDURE — 93352 ADMIN ECG CONTRAST AGENT: CPT

## 2018-08-15 PROCEDURE — 93350 STRESS TTE ONLY: CPT | Mod: 26 | Performed by: INTERNAL MEDICINE

## 2018-08-15 PROCEDURE — 93325 DOPPLER ECHO COLOR FLOW MAPG: CPT | Mod: TC

## 2018-08-15 RX ADMIN — Medication 3 ML: at 09:05

## 2018-08-15 NOTE — NURSING NOTE
Patient presents today for stress echo ordered by MD. Prior to patient visit, chart prep done including confirmation of order, medications reviewed for contraindications, reviewed previous EKG's for trends & concerns and reviewed patient's medical history.     IV started in left AC with a 22G Jeclo Catheter.      Echo technician completed resting portion of echo.     Stress portion of echo completed utilizing bike and pictures taken at [facility].  Blood pressure taken every 2 minutes and documented in Muse system.     Optison medication given 3 ml, wasted 6 ml  3ml Optison mixed with 6ml Saline - [id number]       Patient offered complaints of: fatigue     After completion of stress echo, recovery period with blood pressure monitoring occurs at 1, 3 and 5 minutes and documented in Muse.  IV removed and water provided to patient.  Patient education provided about cardiology interpretation and primary provider will be notified of results.     Dr Chacon provided supervision of the tests performed today.

## 2019-05-23 ENCOUNTER — OFFICE VISIT (OUTPATIENT)
Dept: FAMILY MEDICINE | Facility: CLINIC | Age: 54
End: 2019-05-23
Payer: COMMERCIAL

## 2019-05-23 VITALS
SYSTOLIC BLOOD PRESSURE: 103 MMHG | WEIGHT: 162 LBS | HEART RATE: 80 BPM | OXYGEN SATURATION: 98 % | RESPIRATION RATE: 16 BRPM | BODY MASS INDEX: 28.7 KG/M2 | TEMPERATURE: 98 F | DIASTOLIC BLOOD PRESSURE: 71 MMHG | HEIGHT: 63 IN

## 2019-05-23 DIAGNOSIS — Z13.220 SCREENING FOR HYPERLIPIDEMIA: ICD-10-CM

## 2019-05-23 DIAGNOSIS — K21.9 GASTROESOPHAGEAL REFLUX DISEASE, ESOPHAGITIS PRESENCE NOT SPECIFIED: ICD-10-CM

## 2019-05-23 DIAGNOSIS — I10 BENIGN ESSENTIAL HYPERTENSION: Primary | ICD-10-CM

## 2019-05-23 DIAGNOSIS — Z23 VACCINE FOR VIRAL HEPATITIS: ICD-10-CM

## 2019-05-23 LAB
ANION GAP SERPL CALCULATED.3IONS-SCNC: 4 MMOL/L (ref 3–14)
BUN SERPL-MCNC: 14 MG/DL (ref 7–30)
CALCIUM SERPL-MCNC: 9.2 MG/DL (ref 8.5–10.1)
CHLORIDE SERPL-SCNC: 105 MMOL/L (ref 94–109)
CHOLEST SERPL-MCNC: 225 MG/DL
CO2 SERPL-SCNC: 30 MMOL/L (ref 20–32)
CREAT SERPL-MCNC: 0.64 MG/DL (ref 0.52–1.04)
GFR SERPL CREATININE-BSD FRML MDRD: >90 ML/MIN/{1.73_M2}
GLUCOSE SERPL-MCNC: 98 MG/DL (ref 70–99)
HDLC SERPL-MCNC: 84 MG/DL
LDLC SERPL CALC-MCNC: 125 MG/DL
NONHDLC SERPL-MCNC: 141 MG/DL
POTASSIUM SERPL-SCNC: 3.8 MMOL/L (ref 3.4–5.3)
SODIUM SERPL-SCNC: 139 MMOL/L (ref 133–144)
TRIGL SERPL-MCNC: 79 MG/DL

## 2019-05-23 PROCEDURE — 90707 MMR VACCINE SC: CPT | Performed by: FAMILY MEDICINE

## 2019-05-23 PROCEDURE — 99214 OFFICE O/P EST MOD 30 MIN: CPT | Mod: 25 | Performed by: FAMILY MEDICINE

## 2019-05-23 PROCEDURE — 80048 BASIC METABOLIC PNL TOTAL CA: CPT | Performed by: FAMILY MEDICINE

## 2019-05-23 PROCEDURE — 90471 IMMUNIZATION ADMIN: CPT | Performed by: FAMILY MEDICINE

## 2019-05-23 PROCEDURE — 90632 HEPA VACCINE ADULT IM: CPT | Performed by: FAMILY MEDICINE

## 2019-05-23 PROCEDURE — 90472 IMMUNIZATION ADMIN EACH ADD: CPT | Performed by: FAMILY MEDICINE

## 2019-05-23 PROCEDURE — 36415 COLL VENOUS BLD VENIPUNCTURE: CPT | Performed by: FAMILY MEDICINE

## 2019-05-23 PROCEDURE — 80061 LIPID PANEL: CPT | Performed by: FAMILY MEDICINE

## 2019-05-23 PROCEDURE — 90746 HEPB VACCINE 3 DOSE ADULT IM: CPT | Performed by: FAMILY MEDICINE

## 2019-05-23 RX ORDER — LISINOPRIL 10 MG/1
5-10 TABLET ORAL DAILY
Qty: 90 TABLET | Refills: 3 | Status: SHIPPED | OUTPATIENT
Start: 2019-05-23 | End: 2020-10-22

## 2019-05-23 ASSESSMENT — MIFFLIN-ST. JEOR: SCORE: 1313.96

## 2019-05-23 NOTE — PROGRESS NOTES
Subjective     Ruth Resendez is a 53 year old female who presents to clinic today for the following health issues:    HPI   Hypertension Follow-up      Do you check your blood pressure regularly outside of the clinic? Yes     Are you following a low salt diet? Yes    Are your blood pressures ever more than 140 on the top number (systolic) OR more   than 90 on the bottom number (diastolic), for example 140/90? No    Amount of exercise or physical activity: 2-3 days/week for an average of 30-45 minutes    Problems taking medications regularly: No    Medication side effects: none    Diet: regular (no restrictions)      Hot flashes all the time now and very problematic. Still cycling, but last cycle in January   [facility] Dr. Parsons [facility]    Frustrated with wt  Eating very helathy and good portions.     mammo with gyn      Patient Active Problem List   Diagnosis     Hypertension goal BP (blood pressure) < 140/90     CARDIOVASCULAR SCREENING; LDL GOAL LESS THAN 160     GERD (gastroesophageal reflux disease)     Rhinitis     Microscopic hematuria     Past Surgical History:   Procedure Laterality Date     AS ENLARGE BREAST WITH IMPLANT       COLONOSCOPY WITH CO2 INSUFFLATION N/A 3/26/2018    Procedure: COLONOSCOPY WITH CO2 INSUFFLATION;  Screen for colon cancer  BMI- 27.91  referred by: Renee Mendez MD  [facility] Fax# [phone]  Colonoscopy;  Surgeon: Michi Fernandez MD;  Location:  OR     SURGICAL HISTORY OF -       eye surgery as a child following a sledding injury       Social History     Tobacco Use     Smoking status: Never Smoker     Smokeless tobacco: Never Used   Substance Use Topics     Alcohol use: Yes     Alcohol/week: 0.0 oz     Comment: 4 glasses wine per week.      Family History   Problem Relation Age of Onset     Respiratory Father         lung disease, former smoker, fatal age histiocystosis, lawn chem,  age 62     Cancer Paternal Grandmother   "       throat cancer     Breast Cancer Other      Cancer - colorectal Other         uncle (I didn't ask whether maternal or paternal), fatal in 60s     Neurologic Disorder Mother         parkinsons onset at 66     Asthma No family hx of      C.A.D. No family hx of      Diabetes No family hx of      Hypertension No family hx of      Cerebrovascular Disease No family hx of      Prostate Cancer No family hx of          Current Outpatient Medications   Medication Sig Dispense Refill     lisinopril (PRINIVIL/ZESTRIL) 10 MG tablet Take 0.5-1 tablets (5-10 mg) by mouth daily 90 tablet 3     No Known Allergies    Reviewed and updated as needed this visit by Provider         Review of Systems   ROS COMP: Constitutional, HEENT, cardiovascular, pulmonary, gi and gu systems are negative, except as otherwise noted.      Objective    /71 (BP Location: Right arm, Patient Position: Sitting, Cuff Size: Adult Large)   Pulse 80   Temp 98  F (36.7  C) (Oral)   Resp 16   Ht 1.6 m (5' 3\")   Wt 73.5 kg (162 lb)   SpO2 98%   BMI 28.70 kg/m    Body mass index is 28.7 kg/m .  Physical Exam   GENERAL: healthy, alert and no distress  NECK: no adenopathy, no asymmetry, masses, or scars and thyroid normal to palpation  RESP: lungs clear to auscultation - no rales, rhonchi or wheezes  CV: regular rate and rhythm, normal S1 S2, no S3 or S4, no murmur, click or rub, no peripheral edema and peripheral pulses strong  MS: no gross musculoskeletal defects noted, no edema    Diagnostic Test Results:  Labs reviewed in Epic        Assessment & Plan     1. Benign essential hypertension  At goal.  Blood pressure is on the lower side so we will have her trial half tablet of the lisinopril.  She will monitor blood pressure and increase back to 10 mg if needed.  - lisinopril (PRINIVIL/ZESTRIL) 10 MG tablet; Take 0.5-1 tablets (5-10 mg) by mouth daily  Dispense: 90 tablet; Refill: 3  - Basic metabolic panel    2. Screening for hyperlipidemia  - " "Lipid panel reflex to direct LDL Fasting    3. Gastroesophageal reflux disease, esophagitis presence not specified  Has been controlled    4. Vaccine for viral hepatitis  - HEPATITIS A VACCINE (ADULT)  - HEPATITIS B VACCINE, ADULT, IM    5.  Vaccine for MMR  Given measles outbreak [place] the country and patient's uncertainty of her immunity will give MMR today per CDC guidelines she denies risk of pregnancy     BMI:   Estimated body mass index is 28.7 kg/m  as calculated from the following:    Height as of this encounter: 1.6 m (5' 3\").    Weight as of this encounter: 73.5 kg (162 lb).   Weight management plan: Discussed healthy diet and exercise guidelines        Return in about 1 year (around 5/23/2020) for Medication check.    Renee Mendez MD  [facility]      "

## 2019-05-23 NOTE — NURSING NOTE
Screening Questionnaire for Adult Immunization    Are you sick today?   No   Do you have allergies to medications, food, a vaccine component or latex?   No   Have you ever had a serious reaction after receiving a vaccination?   No   Do you have a long-term health problem with heart disease, lung disease, asthma, kidney disease, metabolic disease (e.g. diabetes), anemia, or other blood disorder?   No   Do you have cancer, leukemia, HIV/AIDS, or any other immune system problem?   No   In the past 3 months, have you taken medications that affect  your immune system, such as prednisone, other steroids, or anticancer drugs; drugs for the treatment of rheumatoid arthritis, Crohn s disease, or psoriasis; or have you had radiation treatments?   No   Have you had a seizure, or a brain or other nervous system problem?   No   During the past year, have you received a transfusion of blood or blood     products, or been given immune (gamma) globulin or antiviral drug?   No   For women: Are you pregnant or is there a chance you could become        pregnant during the next month?   No   Have you received any vaccinations in the past 4 weeks?   No     Immunization questionnaire answers were all negative.      Patient instructed to remain in clinic for 15 minutes afterwards, and to report any adverse reaction to me immediately.       Screening performed by Jovany Cobos on 5/23/2019 at 10:34 AM.

## 2019-09-30 ENCOUNTER — HEALTH MAINTENANCE LETTER (OUTPATIENT)
Age: 54
End: 2019-09-30

## 2020-07-31 DIAGNOSIS — I10 BENIGN ESSENTIAL HYPERTENSION: ICD-10-CM

## 2020-08-06 RX ORDER — LISINOPRIL 10 MG/1
TABLET ORAL
Qty: 90 TABLET | Refills: 3 | OUTPATIENT
Start: 2020-08-06

## 2020-08-06 NOTE — TELEPHONE ENCOUNTER
Pt scheduled for labs and phone visit.  Pt does not need refill prior.    Delaney BOLANOS, Patient Care

## 2020-08-06 NOTE — TELEPHONE ENCOUNTER
Needs virtual appointment and labs-it has been over a year since patient was seen or labs were done. Once appointment is made we can give refills until that point.   Thank you,  ALONSO Huber, NP-C  [facility]

## 2020-08-15 ENCOUNTER — OFFICE VISIT (OUTPATIENT)
Dept: URGENT CARE | Facility: URGENT CARE | Age: 55
End: 2020-08-15
Payer: COMMERCIAL

## 2020-08-15 VITALS
HEART RATE: 89 BPM | DIASTOLIC BLOOD PRESSURE: 78 MMHG | OXYGEN SATURATION: 99 % | SYSTOLIC BLOOD PRESSURE: 118 MMHG | TEMPERATURE: 97.8 F

## 2020-08-15 DIAGNOSIS — L50.9 HIVES: Primary | ICD-10-CM

## 2020-08-15 PROCEDURE — 99213 OFFICE O/P EST LOW 20 MIN: CPT | Performed by: PHYSICIAN ASSISTANT

## 2020-08-15 RX ORDER — DIPHENHYDRAMINE HCL 25 MG
25 CAPSULE ORAL ONCE
Status: COMPLETED | OUTPATIENT
Start: 2020-08-15 | End: 2020-08-15

## 2020-08-15 RX ORDER — PREDNISONE 20 MG/1
20 TABLET ORAL DAILY
Qty: 4 TABLET | Refills: 0 | Status: SHIPPED | OUTPATIENT
Start: 2020-08-15 | End: 2020-08-19

## 2020-08-15 RX ORDER — PREDNISONE 10 MG/1
20 TABLET ORAL ONCE
Status: COMPLETED | OUTPATIENT
Start: 2020-08-15 | End: 2020-08-15

## 2020-08-15 RX ORDER — CETIRIZINE HYDROCHLORIDE 10 MG/1
10 TABLET ORAL DAILY
Qty: 30 TABLET | Refills: 0 | Status: SHIPPED | OUTPATIENT
Start: 2020-08-15 | End: 2020-08-21 | Stop reason: DRUGHIGH

## 2020-08-15 RX ORDER — EPINEPHRINE 0.3 MG/.3ML
0.3 INJECTION SUBCUTANEOUS PRN
Qty: 1 EACH | Refills: 0 | Status: SHIPPED | OUTPATIENT
Start: 2020-08-15 | End: 2021-10-22

## 2020-08-15 RX ADMIN — PREDNISONE 20 MG: 10 TABLET ORAL at 10:57

## 2020-08-15 RX ADMIN — Medication 25 MG: at 10:56

## 2020-08-15 NOTE — PATIENT INSTRUCTIONS
Patient Education     Hives (Adult)  Hives are pink or red bumps on the skin. These bumps are also known as wheals. The bumps can itch, burn, or sting. Hives can occur anywhere on the body. They vary in size and shape and can form in clusters. Individual hives can appear and go away quickly. New hives may develop as old ones fade. Hives are common and usually harmless. Occasionally hives are a sign of a serious allergy.  Hives are often caused by an allergic reaction. It may be an allergic reaction to foods such as fruit, shellfish, chocolate, nuts, or tomatoes. It may be a reaction to pollens, animal fur, or mold spores. Medicines, chemicals, and insect bites can also cause hives. And hives can be caused by hot sun or cold air. The cause of hives can be difficult to find.  You may be given medicines to relieve swelling and itching. Follow all instructions when using these medicines. The hives will usually fade in a few days, but can last up to 2 weeks.  Home care  Follow these tips:    Try to find the cause of the hives and eliminate it. Discuss possible causes with your healthcare provider. Future reactions to the same allergen may be worse.    Don t scratch the hives. Scratching will delay healing. To reduce itching, apply cool, wet compresses to the skin.    Dress in soft, loose cotton clothing.    Don t bathe in hot water. This can make the itching worse.    Apply an ice pack or cool pack wrapped in a thin towel to your skin. This will help reduce redness and itching. But if your hives were caused by exposure to cold, then do not apply more cold to them.    You may use over-the counter antihistamines to reduce itching. Some older antihistamines, such as diphenhydramine and chlorpheniramine, are inexpensive. But they need to be taken often and may make you sleepy. They are best used at bedtime. Don t use diphenhydramine if you have glaucoma or have trouble urinating because of an enlarged prostate. Newer  antihistamines, such as loratadine, cetirizine, and fexofenadine, are generally more expensive. But they tend to have fewer side effects, such as drowsiness. They can be taken less often.    Another type of antihistamine is used to treat heartburn. This type includes ranitidine, nizatidine, famotidine, and cimetidine. These are sometimes used along with the above antihistamines if a single medicine is not working.  Follow-up care  Follow up with your healthcare provider if your symptoms don't get better in 2 days. Ask your provider about allergy testing if you have had a severe reaction, or have had several episodes of hives. He or she can use the allergy testing to find out what you are allergic to.  When to seek medical advice  Call your healthcare provider right away if any of these occur:    Fever of 100.4 F (38.0 C) or higher, or as directed by your healthcare provider    Redness, swelling, or pain    Foul-smelling fluid coming from the rash  Call 911  Call 911 if any of the following occur:    Swelling of the face, throat, or tongue    Trouble breathing or swallowing    Dizziness, weakness, or fainting  Date Last Reviewed: 9/1/2016 2000-2019 [employer]. [address] [place] [address], [place] [address]. All rights reserved. This information is not intended as a substitute for professional medical care. Always follow your healthcare professional's instructions.

## 2020-08-15 NOTE — PROGRESS NOTES
SUBJECTIVE:   Ruth Resendez is a 54 year old female presenting with a chief complaint of   Chief Complaint   Patient presents with     Urgent Care     Hives     She is an established patient of [facility].  Patient presents with hives, whole body.  Patient noted 2 days ago itching, that has progressed.  Started on torso.  Has skin sensitivities.  Last took benadryl yesterday.  Noted lips swelling and tingly.  No SOB or wheezing.  Has had multiple episodes of hives in past patient had treated with benadryl and has gone away.        Review of Systems   Skin: Positive for rash.   All other systems reviewed and are negative.      Past Medical History:   Diagnosis Date     C. difficile diarrhea 10/2009    tx with oral flagyl  more thanonce then oral vanco-Resistent to flagyl. Took Vanco for 6 months.     Essential hypertension      Mastitis      Normal vaginal delivery   x 4    last delivery 09     Oral contraceptive use      Pregnancy induced hypertension      Family History   Problem Relation Age of Onset     Respiratory Father         lung disease, former smoker, fatal age histiocystosis, lawn chem,  age 62     Cancer Paternal Grandmother         throat cancer     Breast Cancer Other      Cancer - colorectal Other         uncle (I didn't ask whether maternal or paternal), fatal in 60s     Neurologic Disorder Mother         parkinsons onset at 66     Asthma No family hx of      C.A.D. No family hx of      Diabetes No family hx of      Hypertension No family hx of      Cerebrovascular Disease No family hx of      Prostate Cancer No family hx of      Current Outpatient Medications   Medication Sig Dispense Refill     cetirizine (ZYRTEC) 10 MG tablet Take 1 tablet (10 mg) by mouth daily 30 tablet 0     EPINEPHrine (ANY BX GENERIC EQUIV) 0.3 MG/0.3ML injection 2-pack Inject 0.3 mLs (0.3 mg) into the muscle as needed for anaphylaxis 1 each 0     lisinopril (PRINIVIL/ZESTRIL) 10 MG tablet Take 0.5-1 tablets (5-10  mg) by mouth daily 90 tablet 3     predniSONE (DELTASONE) 20 MG tablet Take 1 tablet (20 mg) by mouth daily for 4 days 4 tablet 0     Social History     Tobacco Use     Smoking status: Never Smoker     Smokeless tobacco: Never Used   Substance Use Topics     Alcohol use: Yes     Alcohol/week: 0.0 standard drinks     Comment: 4 glasses wine per week.        OBJECTIVE  /78   Pulse 89   Temp 97.8  F (36.6  C) (Tympanic)   SpO2 99%     Physical Exam  Vitals signs reviewed.   Constitutional:       Appearance: Normal appearance. She is obese.   HENT:      Mouth/Throat:      Mouth: Mucous membranes are moist.      Pharynx: Oropharynx is clear.      Comments: Tongue and lips normal size.  Eyes:      Extraocular Movements: Extraocular movements intact.      Conjunctiva/sclera: Conjunctivae normal.   Cardiovascular:      Rate and Rhythm: Normal rate and regular rhythm.      Pulses: Normal pulses.      Heart sounds: Normal heart sounds.   Pulmonary:      Effort: Pulmonary effort is normal. No respiratory distress.      Breath sounds: Normal breath sounds. No stridor. No wheezing, rhonchi or rales.   Skin:     Capillary Refill: Capillary refill takes less than 2 seconds.      Comments: Hives to all parts of skin of varying sizes.  A few excoriations.   Neurological:      Mental Status: She is alert.   Psychiatric:         Mood and Affect: Mood normal.         Judgment: Judgment normal.         Labs:  No results found for this or any previous visit (from the past 24 hour(s)).    X-Ray was not done.    ASSESSMENT:      ICD-10-CM    1. Hives  L50.9 predniSONE (DELTASONE) tablet 20 mg     diphenhydrAMINE (BENADRYL) capsule 25 mg     predniSONE (DELTASONE) 20 MG tablet     cetirizine (ZYRTEC) 10 MG tablet     EPINEPHrine (ANY BX GENERIC EQUIV) 0.3 MG/0.3ML injection 2-pack     ALLERGY/ASTHMA ADULT REFERRAL        Medical Decision Making:    Differential Diagnosis:  Rash: Hives    Serious Comorbid Conditions:  Adult:   None    PLAN:    Rash:  Zyrtec, prednisone, epipen.   Patient given prednisone and benadryl here.  Allergy referral.      Followup:    If not improving or if condition worsens, follow up with your Primary Care Provider, If not improving or if conditions worsens over the next 12-24 hours, go to the Emergency Department    Patient Instructions     Patient Education     Hives (Adult)  Hives are pink or red bumps on the skin. These bumps are also known as wheals. The bumps can itch, burn, or sting. Hives can occur anywhere on the body. They vary in size and shape and can form in clusters. Individual hives can appear and go away quickly. New hives may develop as old ones fade. Hives are common and usually harmless. Occasionally hives are a sign of a serious allergy.  Hives are often caused by an allergic reaction. It may be an allergic reaction to foods such as fruit, shellfish, chocolate, nuts, or tomatoes. It may be a reaction to pollens, animal fur, or mold spores. Medicines, chemicals, and insect bites can also cause hives. And hives can be caused by hot sun or cold air. The cause of hives can be difficult to find.  You may be given medicines to relieve swelling and itching. Follow all instructions when using these medicines. The hives will usually fade in a few days, but can last up to 2 weeks.  Home care  Follow these tips:    Try to find the cause of the hives and eliminate it. Discuss possible causes with your healthcare provider. Future reactions to the same allergen may be worse.    Don t scratch the hives. Scratching will delay healing. To reduce itching, apply cool, wet compresses to the skin.    Dress in soft, loose cotton clothing.    Don t bathe in hot water. This can make the itching worse.    Apply an ice pack or cool pack wrapped in a thin towel to your skin. This will help reduce redness and itching. But if your hives were caused by exposure to cold, then do not apply more cold to them.    You may use  over-the counter antihistamines to reduce itching. Some older antihistamines, such as diphenhydramine and chlorpheniramine, are inexpensive. But they need to be taken often and may make you sleepy. They are best used at bedtime. Don t use diphenhydramine if you have glaucoma or have trouble urinating because of an enlarged prostate. Newer antihistamines, such as loratadine, cetirizine, and fexofenadine, are generally more expensive. But they tend to have fewer side effects, such as drowsiness. They can be taken less often.    Another type of antihistamine is used to treat heartburn. This type includes ranitidine, nizatidine, famotidine, and cimetidine. These are sometimes used along with the above antihistamines if a single medicine is not working.  Follow-up care  Follow up with your healthcare provider if your symptoms don't get better in 2 days. Ask your provider about allergy testing if you have had a severe reaction, or have had several episodes of hives. He or she can use the allergy testing to find out what you are allergic to.  When to seek medical advice  Call your healthcare provider right away if any of these occur:    Fever of 100.4 F (38.0 C) or higher, or as directed by your healthcare provider    Redness, swelling, or pain    Foul-smelling fluid coming from the rash  Call 911  Call 911 if any of the following occur:    Swelling of the face, throat, or tongue    Trouble breathing or swallowing    Dizziness, weakness, or fainting  Date Last Reviewed: 9/1/2016 2000-2019 [employer]. [address] [place] [address], [place] [address]. All rights reserved. This information is not intended as a substitute for professional medical care. Always follow your healthcare professional's instructions.

## 2020-08-17 ENCOUNTER — DOCUMENTATION ONLY (OUTPATIENT)
Dept: LAB | Facility: CLINIC | Age: 55
End: 2020-08-17

## 2020-08-17 DIAGNOSIS — I10 HYPERTENSION GOAL BP (BLOOD PRESSURE) < 140/90: Primary | ICD-10-CM

## 2020-08-17 DIAGNOSIS — R31.29 MICROSCOPIC HEMATURIA: ICD-10-CM

## 2020-08-17 NOTE — PROGRESS NOTES
Patient has an upcoming lab appointment on 08.20.20 at 09:30 am. Please review and place future orders that may be needed. Otherwise please call patient to cancel lab appointment.    Thank you,  [facility] lab staff

## 2020-08-20 DIAGNOSIS — I10 HYPERTENSION GOAL BP (BLOOD PRESSURE) < 140/90: ICD-10-CM

## 2020-08-20 DIAGNOSIS — R31.29 MICROSCOPIC HEMATURIA: ICD-10-CM

## 2020-08-20 LAB
ALBUMIN UR-MCNC: NEGATIVE MG/DL
ANION GAP SERPL CALCULATED.3IONS-SCNC: 3 MMOL/L (ref 3–14)
APPEARANCE UR: CLEAR
BACTERIA #/AREA URNS HPF: ABNORMAL /HPF
BILIRUB UR QL STRIP: NEGATIVE
BUN SERPL-MCNC: 16 MG/DL (ref 7–30)
CALCIUM SERPL-MCNC: 8.5 MG/DL (ref 8.5–10.1)
CHLORIDE SERPL-SCNC: 105 MMOL/L (ref 94–109)
CHOLEST SERPL-MCNC: 218 MG/DL
CO2 SERPL-SCNC: 32 MMOL/L (ref 20–32)
COLOR UR AUTO: YELLOW
CREAT SERPL-MCNC: 0.74 MG/DL (ref 0.52–1.04)
CREAT UR-MCNC: 137 MG/DL
GFR SERPL CREATININE-BSD FRML MDRD: >90 ML/MIN/{1.73_M2}
GLUCOSE SERPL-MCNC: 81 MG/DL (ref 70–99)
GLUCOSE UR STRIP-MCNC: NEGATIVE MG/DL
HDLC SERPL-MCNC: 74 MG/DL
HGB UR QL STRIP: ABNORMAL
KETONES UR STRIP-MCNC: NEGATIVE MG/DL
LDLC SERPL CALC-MCNC: 112 MG/DL
LEUKOCYTE ESTERASE UR QL STRIP: NEGATIVE
MICROALBUMIN UR-MCNC: 10 MG/L
MICROALBUMIN/CREAT UR: 7.24 MG/G CR (ref 0–25)
MUCOUS THREADS #/AREA URNS LPF: PRESENT /LPF
NITRATE UR QL: NEGATIVE
NON-SQ EPI CELLS #/AREA URNS LPF: ABNORMAL /LPF
NONHDLC SERPL-MCNC: 144 MG/DL
PH UR STRIP: 6 PH (ref 5–7)
POTASSIUM SERPL-SCNC: 3.7 MMOL/L (ref 3.4–5.3)
RBC #/AREA URNS AUTO: ABNORMAL /HPF
SODIUM SERPL-SCNC: 140 MMOL/L (ref 133–144)
SOURCE: ABNORMAL
SP GR UR STRIP: 1.02 (ref 1–1.03)
TRIGL SERPL-MCNC: 161 MG/DL
UROBILINOGEN UR STRIP-ACNC: 0.2 EU/DL (ref 0.2–1)
WBC #/AREA URNS AUTO: ABNORMAL /HPF

## 2020-08-20 PROCEDURE — 82043 UR ALBUMIN QUANTITATIVE: CPT | Performed by: FAMILY MEDICINE

## 2020-08-20 PROCEDURE — 81001 URINALYSIS AUTO W/SCOPE: CPT | Performed by: FAMILY MEDICINE

## 2020-08-20 PROCEDURE — 80061 LIPID PANEL: CPT | Performed by: FAMILY MEDICINE

## 2020-08-20 PROCEDURE — 36415 COLL VENOUS BLD VENIPUNCTURE: CPT | Performed by: FAMILY MEDICINE

## 2020-08-20 PROCEDURE — 80048 BASIC METABOLIC PNL TOTAL CA: CPT | Performed by: FAMILY MEDICINE

## 2020-08-21 ENCOUNTER — VIRTUAL VISIT (OUTPATIENT)
Dept: FAMILY MEDICINE | Facility: CLINIC | Age: 55
End: 2020-08-21
Payer: COMMERCIAL

## 2020-08-21 DIAGNOSIS — K21.9 GASTROESOPHAGEAL REFLUX DISEASE, ESOPHAGITIS PRESENCE NOT SPECIFIED: Primary | ICD-10-CM

## 2020-08-21 DIAGNOSIS — I10 BENIGN ESSENTIAL HYPERTENSION: ICD-10-CM

## 2020-08-21 DIAGNOSIS — R31.29 MICROSCOPIC HEMATURIA: ICD-10-CM

## 2020-08-21 DIAGNOSIS — I10 HYPERTENSION GOAL BP (BLOOD PRESSURE) < 140/90: ICD-10-CM

## 2020-08-21 PROCEDURE — 99214 OFFICE O/P EST MOD 30 MIN: CPT | Mod: TEL | Performed by: FAMILY MEDICINE

## 2020-08-21 RX ORDER — LISINOPRIL 5 MG/1
2.5 TABLET ORAL DAILY
Qty: 90 TABLET | Refills: 3 | Status: SHIPPED | OUTPATIENT
Start: 2020-08-21 | End: 2020-08-21

## 2020-08-21 RX ORDER — LISINOPRIL 5 MG/1
5 TABLET ORAL DAILY
Qty: 90 TABLET | Refills: 3 | Status: SHIPPED | OUTPATIENT
Start: 2020-08-21 | End: 2021-10-21

## 2020-08-21 NOTE — PROGRESS NOTES
"Ruth Resendez is a 54 year old female who is being evaluated via a billable telephone visit.      The patient has been notified of following:     \"This telephone visit will be conducted via a call between you and your physician/provider. We have found that certain health care needs can be provided without the need for a physical exam.  This service lets us provide the care you need with a short phone conversation.  If a prescription is necessary we can send it directly to your pharmacy.  If lab work is needed we can place an order for that and you can then stop by our lab to have the test done at a later time.    Telephone visits are billed at different rates depending on your insurance coverage. During this emergency period, for some insurers they may be billed the same as an in-person visit.  Please reach out to your insurance provider with any questions.    If during the course of the call the physician/provider feels a telephone visit is not appropriate, you will not be charged for this service.\"    Patient has given verbal consent for Telephone visit?  Yes    What phone number would you like to be contacted at? [phone]    How would you like to obtain your AVS? MyChart    Subjective     Ruth Resendez is a 54 year old female who presents via phone visit today for the following health issues:    HPI    Annual medication/lab check per patient     Recently Had severe allergic reaction where throat was closing. Hives. Treated with steroid/zyrtec and was given epi-pen.   Has f/u with allergist    Mood is down with pandemic but doesn't feel clinically depressed    No gerd.     Home bp's have been normal.   Did titrate lisinopril down to 5 mg after our last appt and still having excellent control.     Has appt for mammo coming up along with gyn annual exam    The 10-year ASCVD risk score (Uyen JOSIAH Jr., et al., 2013) is: 1.5%    Values used to calculate the score:      Age: 54 years      Sex: Female      " Is Non- : No      Diabetic: No      Tobacco smoker: No      Systolic Blood Pressure: 118 mmHg      Is BP treated: Yes      HDL Cholesterol: 74 mg/dL      Total Cholesterol: 218 mg/dL      Review of Systems   Constitutional, HEENT, cardiovascular, pulmonary, gi and gu systems are negative, except as otherwise noted.       Objective          Vitals:  No vitals were obtained today due to virtual visit.    healthy, alert and no distress  PSYCH: Alert and oriented times 3; coherent speech, normal   rate and volume, able to articulate logical thoughts, able   to abstract reason, no tangential thoughts, no hallucinations   or delusions  Her affect is normal  RESP: No cough, no audible wheezing, able to talk in full sentences  Remainder of exam unable to be completed due to telephone visits  Component      Latest Ref Rng & Units 8/20/2020   Color Urine       Yellow   Appearance Urine       Clear   Glucose Urine      NEG:Negative mg/dL Negative   Bilirubin Urine      NEG:Negative Negative   Ketones Urine      NEG:Negative mg/dL Negative   Specific Gravity Urine      1.003 - 1.035 1.025   Blood Urine      NEG:Negative Small (A)   pH Urine      5.0 - 7.0 pH 6.0   Protein Albumin Urine      NEG:Negative mg/dL Negative   Urobilinogen Urine      0.2 - 1.0 EU/dL 0.2   Nitrite Urine      NEG:Negative Negative   Leukocyte Esterase Urine      NEG:Negative Negative   Source       Midstream Urine   Sodium      133 - 144 mmol/L 140   Potassium      3.4 - 5.3 mmol/L 3.7   Chloride      94 - 109 mmol/L 105   Carbon Dioxide      20 - 32 mmol/L 32   Anion Gap      3 - 14 mmol/L 3   Glucose      70 - 99 mg/dL 81   Urea Nitrogen      7 - 30 mg/dL 16   Creatinine      0.52 - 1.04 mg/dL 0.74   GFR Estimate      >60 mL/min/1.73:m2 >90   GFR Estimate If Black      >60 mL/min/1.73:m2 >90   Calcium      8.5 - 10.1 mg/dL 8.5   Cholesterol      <200 mg/dL 218 (H)   Triglycerides      <150 mg/dL 161 (H)   HDL Cholesterol       >49 mg/dL 74   LDL Cholesterol Calculated      <100 mg/dL 112 (H)   Non HDL Cholesterol      <130 mg/dL 144 (H)   WBC Urine      OTO5:0 - 5 /HPF 0 - 5   RBC Urine      OTO2:O - 2 /HPF O - 2   Squamous Epithelial /LPF Urine      FEW:Few /LPF Few   Bacteria Urine      NEG:Negative /HPF Moderate (A)   Mucous Urine      NEG:Negative /LPF Present (A)   Creatinine Urine      mg/dL 137   Albumin Urine mg/L      mg/L 10   Albumin Urine mg/g Cr      0 - 25 mg/g Cr 7.24           Assessment/Plan:    Assessment & Plan     Gastroesophageal reflux disease, esophagitis presence not specified  No recent symptoms, monitor    Benign essential hypertension  Controlled on lower ACE-I dose. contin  - lisinopril (ZESTRIL) 5 MG tablet; Take 0.5 tablets (2.5 mg) by mouth daily    Microscopic hematuria  No recent hematuria  \  Patient Instructions   Schedule tetanus vaccine, hepatitis B and shingles vaccines with medical assistant only vaccine        Return in about 1 year (around 8/21/2021) for Medication check.    Renee Mendez MD  [facility]    Phone call duration:  12 minutes

## 2020-08-21 NOTE — NURSING NOTE
"Chief Complaint   Patient presents with     Recheck Medication     initial There were no vitals taken for this visit. Estimated body mass index is 28.7 kg/m  as calculated from the following:    Height as of 5/23/19: 1.6 m (5' 3\").    Weight as of 5/23/19: 73.5 kg (162 lb).  BP completed using cuff size: .  L  R arm      Health Maintenance that is potentially due pending provider review:      Felix Pisano ma  "

## 2020-09-18 ENCOUNTER — TRANSFERRED RECORDS (OUTPATIENT)
Dept: HEALTH INFORMATION MANAGEMENT | Facility: CLINIC | Age: 55
End: 2020-09-18

## 2020-09-22 ENCOUNTER — ALLIED HEALTH/NURSE VISIT (OUTPATIENT)
Dept: NURSING | Facility: CLINIC | Age: 55
End: 2020-09-22
Payer: COMMERCIAL

## 2020-09-22 DIAGNOSIS — Z23 NEED FOR VACCINATION: ICD-10-CM

## 2020-09-22 DIAGNOSIS — Z23 NEED FOR PROPHYLACTIC VACCINATION AND INOCULATION AGAINST INFLUENZA: Primary | ICD-10-CM

## 2020-09-22 PROCEDURE — 99207 ZZC NO CHARGE LOS: CPT

## 2020-09-22 PROCEDURE — 90471 IMMUNIZATION ADMIN: CPT

## 2020-09-22 PROCEDURE — 90472 IMMUNIZATION ADMIN EACH ADD: CPT

## 2020-09-22 PROCEDURE — 90750 HZV VACC RECOMBINANT IM: CPT

## 2020-09-22 PROCEDURE — 90682 RIV4 VACC RECOMBINANT DNA IM: CPT

## 2020-09-22 PROCEDURE — 90746 HEPB VACCINE 3 DOSE ADULT IM: CPT

## 2020-09-22 PROCEDURE — 90714 TD VACC NO PRESV 7 YRS+ IM: CPT

## 2020-10-22 ENCOUNTER — ANCILLARY PROCEDURE (OUTPATIENT)
Dept: MAMMOGRAPHY | Facility: CLINIC | Age: 55
End: 2020-10-22
Payer: COMMERCIAL

## 2020-10-22 ENCOUNTER — OFFICE VISIT (OUTPATIENT)
Dept: OBGYN | Facility: CLINIC | Age: 55
End: 2020-10-22
Payer: COMMERCIAL

## 2020-10-22 VITALS — WEIGHT: 176 LBS | HEART RATE: 72 BPM | HEIGHT: 64 IN | BODY MASS INDEX: 30.05 KG/M2

## 2020-10-22 DIAGNOSIS — Z01.419 ENCOUNTER FOR GYNECOLOGICAL EXAMINATION WITHOUT ABNORMAL FINDING: Primary | ICD-10-CM

## 2020-10-22 DIAGNOSIS — Z12.31 VISIT FOR SCREENING MAMMOGRAM: ICD-10-CM

## 2020-10-22 PROCEDURE — 99396 PREV VISIT EST AGE 40-64: CPT | Performed by: OBSTETRICS & GYNECOLOGY

## 2020-10-22 PROCEDURE — 77063 BREAST TOMOSYNTHESIS BI: CPT | Mod: TC | Performed by: RADIOLOGY

## 2020-10-22 PROCEDURE — 77067 SCR MAMMO BI INCL CAD: CPT | Mod: TC | Performed by: RADIOLOGY

## 2020-10-22 ASSESSMENT — ANXIETY QUESTIONNAIRES
7. FEELING AFRAID AS IF SOMETHING AWFUL MIGHT HAPPEN: NOT AT ALL
GAD7 TOTAL SCORE: 0
3. WORRYING TOO MUCH ABOUT DIFFERENT THINGS: NOT AT ALL
2. NOT BEING ABLE TO STOP OR CONTROL WORRYING: NOT AT ALL
1. FEELING NERVOUS, ANXIOUS, OR ON EDGE: NOT AT ALL
6. BECOMING EASILY ANNOYED OR IRRITABLE: NOT AT ALL
5. BEING SO RESTLESS THAT IT IS HARD TO SIT STILL: NOT AT ALL

## 2020-10-22 ASSESSMENT — PATIENT HEALTH QUESTIONNAIRE - PHQ9
SUM OF ALL RESPONSES TO PHQ QUESTIONS 1-9: 0
5. POOR APPETITE OR OVEREATING: NOT AT ALL

## 2020-10-22 ASSESSMENT — MIFFLIN-ST. JEOR: SCORE: 1388.33

## 2020-10-22 NOTE — PROGRESS NOTES
Ruth is a 54 year old  female who presents for annual exam.     Besides routine health maintenance, she has no other health concerns today .    HPI:  The patient's PCP is  Renee Mendez MD.    No GYN issues.   Still has menses.   Bladder good.         GYNECOLOGIC HISTORY:    Patient's last menstrual period was 2020.    Regular menses? No, almost made it a year up until August!  Her current contraception method is: vasectomy.  She  reports that she has never smoked. She has never used smokeless tobacco.    Patient is sexually active.  STD testing offered?  Declined  Last PHQ-9 score on record =   PHQ-9 SCORE 10/22/2020   PHQ-9 Total Score -   PHQ-9 Total Score 0     Last GAD7 score on record =   DAV-7 SCORE 10/22/2020   Total Score -   Total Score 0     Alcohol Score = 3    HEALTH MAINTENANCE:  Cholesterol: With PCP  Cholesterol   Date Value Ref Range Status   2020 218 (H) <200 mg/dL Final     Comment:     Desirable:       <200 mg/dl   2019 225 (H) <200 mg/dL Final     Comment:     Desirable:       <200 mg/dl      Last Mammo: 18, Result: Normal, Next Mammo: Today   Pap:   Lab Results   Component Value Date    PAP NIL HPV- 2017      Colonoscopy:  3/26/18, Result: polyp, Next Colonoscopy: .  Dexa:  never    Health maintenance updated:  yes    HISTORY:  OB History    Para Term  AB Living   6 4 0 0 2 4   SAB TAB Ectopic Multiple Live Births   2 0 0 0 0      # Outcome Date GA Lbr Diego/2nd Weight Sex Delivery Anes PTL Lv   6 SAB  11w0d          5 SAB            4 Para            3 Para            2 Para            1 Para                Patient Active Problem List   Diagnosis     Hypertension goal BP (blood pressure) < 140/90     CARDIOVASCULAR SCREENING; LDL GOAL LESS THAN 160     GERD (gastroesophageal reflux disease)     Rhinitis     Microscopic hematuria     Past Surgical History:   Procedure Laterality Date     AS ENLARGE BREAST WITH IMPLANT        "COLONOSCOPY WITH CO2 INSUFFLATION N/A 3/26/2018    Procedure: COLONOSCOPY WITH CO2 INSUFFLATION;  Screen for colon cancer  BMI- 27.91  referred by: Renee Mendez MD  [facility] Fax# [phone]  Colonoscopy;  Surgeon: Michi Fernandez MD;  Location:  OR     SURGICAL HISTORY OF -       eye surgery as a child following a sledding injury      Social History     Tobacco Use     Smoking status: Never Smoker     Smokeless tobacco: Never Used   Substance Use Topics     Alcohol use: Yes     Alcohol/week: 0.0 standard drinks     Comment: 4 glasses wine per week.       Problem (# of Occurrences) Relation (Name,Age of Onset)    Breast Cancer (1) Other    Cancer (1) Paternal Grandmother: throat cancer    Cancer - colorectal (1) Other: uncle (I didn't ask whether maternal or paternal), fatal in 60s    Neurologic Disorder (1) Mother: parkinsons onset at 66    Respiratory (1) Father: lung disease, former smoker, fatal age histiocystosis, lawn chem,  age 62       Negative family history of: Asthma, C.A.D., Diabetes, Hypertension, Cerebrovascular Disease, Prostate Cancer            Current Outpatient Medications   Medication Sig     EPINEPHrine (ANY BX GENERIC EQUIV) 0.3 MG/0.3ML injection 2-pack Inject 0.3 mLs (0.3 mg) into the muscle as needed for anaphylaxis     lisinopril (ZESTRIL) 5 MG tablet Take 1 tablet (5 mg) by mouth daily     No current facility-administered medications for this visit.      No Known Allergies    Past medical, surgical, social and family histories were reviewed and updated in EPIC.    ROS:   12 point review of systems negative other than symptoms noted below or in the HPI.      EXAM:  Pulse 72   Ht 1.626 m (5' 4\")   Wt 79.8 kg (176 lb)   LMP 2020   BMI 30.21 kg/m     BMI: Body mass index is 30.21 kg/m .    PHYSICAL EXAM:  Constitutional:   Appearance: Well nourished, well developed, alert, in no acute distress  Neck:  Lymph Nodes:  No lymphadenopathy present    Thyroid: "  Gland size normal, nontender, no nodules or masses present  on palpation  Chest:  Respiratory Effort:  Breathing unlabored  Cardiovascular:    Heart: Auscultation:  Regular rate, normal rhythm, no murmurs present  Breasts: Inspection of Breasts:  No lymphadenopathy present., Palpation of Breasts and Axillae:  No masses present on palpation, no breast tenderness., Axillary Lymph Nodes:  No lymphadenopathy present., No nodularity, asymmetry or nipple discharge bilaterally. and IMPLANTS BILATERALLY  Gastrointestinal:   Abdominal Examination:  Abdomen nontender to palpation, tone normal without rigidity or guarding, no masses present, umbilicus without lesions   Liver and Spleen:  No hepatomegaly present, liver nontender to palpation    Hernias:  No hernias present  Lymphatic: Lymph Nodes:  No other lymphadenopathy present  Skin:  General Inspection:  No rashes present, no lesions present, no areas of  discoloration  Neurologic:    Mental Status:  Oriented X3.  Normal strength and tone, sensory exam                grossly normal, mentation intact and speech normal.    Psychiatric:   Mentation appears normal and affect normal/bright.         Pelvic Exam:  External Genitalia:     Normal appearance for age, no discharge present, no tenderness present, no inflammatory lesions present, color normal  Vagina:     Normal vaginal vault without central or paravaginal defects, no discharge present, no inflammatory lesions present, no masses present  Bladder:     Nontender to palpation  Urethra:   Urethral Body:  Urethra palpation normal, urethra structural support normal   Urethral Meatus:  No erythema or lesions present  Cervix:     Appearance healthy, no lesions present, nontender to palpation, no bleeding present  Uterus:     Uterus: firm, normal sized and nontender, anteverted in position.   Adnexa:     No adnexal tenderness present, no adnexal masses present  Perineum:     Perineum within normal limits, no evidence of  trauma, no rashes or skin lesions present  Anus:     Anus within normal limits, no hemorrhoids present  Inguinal Lymph Nodes:     No lymphadenopathy present  Pubic Hair:     Normal pubic hair distribution for age  Genitalia and Groin:     No rashes present, no lesions present, no areas of discoloration, no masses present      COUNSELING:   Reviewed preventive health counseling, as reflected in patient instructions       Regular exercise    BMI: Body mass index is 30.21 kg/m .  Weight management plan: Discussed weight loss    ASSESSMENT:  54 year old female with satisfactory annual exam.    ICD-10-CM    1. Encounter for gynecological examination without abnormal finding  Z01.419        PLAN:  Discussed menopause.    Martin Parsons MD

## 2020-10-23 ASSESSMENT — ANXIETY QUESTIONNAIRES: GAD7 TOTAL SCORE: 0

## 2020-11-19 ENCOUNTER — TRANSFERRED RECORDS (OUTPATIENT)
Dept: HEALTH INFORMATION MANAGEMENT | Facility: CLINIC | Age: 55
End: 2020-11-19

## 2021-03-30 ENCOUNTER — IMMUNIZATION (OUTPATIENT)
Dept: NURSING | Facility: CLINIC | Age: 56
End: 2021-03-30
Payer: COMMERCIAL

## 2021-03-30 PROCEDURE — 0001A PR COVID VAC PFIZER DIL RECON 30 MCG/0.3 ML IM: CPT

## 2021-03-30 PROCEDURE — 91300 PR COVID VAC PFIZER DIL RECON 30 MCG/0.3 ML IM: CPT

## 2021-04-20 ENCOUNTER — IMMUNIZATION (OUTPATIENT)
Dept: NURSING | Facility: CLINIC | Age: 56
End: 2021-04-20
Attending: INTERNAL MEDICINE
Payer: COMMERCIAL

## 2021-04-20 PROCEDURE — 0002A PR COVID VAC PFIZER DIL RECON 30 MCG/0.3 ML IM: CPT

## 2021-04-20 PROCEDURE — 91300 PR COVID VAC PFIZER DIL RECON 30 MCG/0.3 ML IM: CPT

## 2021-06-24 ENCOUNTER — RECORDS - HEALTHEAST (OUTPATIENT)
Dept: ADMINISTRATIVE | Facility: CLINIC | Age: 56
End: 2021-06-24

## 2021-10-04 ENCOUNTER — MYC MEDICAL ADVICE (OUTPATIENT)
Dept: FAMILY MEDICINE | Facility: CLINIC | Age: 56
End: 2021-10-04

## 2021-10-05 ENCOUNTER — TELEPHONE (OUTPATIENT)
Dept: FAMILY MEDICINE | Facility: CLINIC | Age: 56
End: 2021-10-05

## 2021-10-05 ENCOUNTER — OFFICE VISIT (OUTPATIENT)
Dept: FAMILY MEDICINE | Facility: CLINIC | Age: 56
End: 2021-10-05
Payer: COMMERCIAL

## 2021-10-05 VITALS
WEIGHT: 167 LBS | BODY MASS INDEX: 28.67 KG/M2 | OXYGEN SATURATION: 100 % | SYSTOLIC BLOOD PRESSURE: 120 MMHG | DIASTOLIC BLOOD PRESSURE: 65 MMHG | HEART RATE: 76 BPM | TEMPERATURE: 97.7 F

## 2021-10-05 DIAGNOSIS — R20.2 PARESTHESIA OF RIGHT ARM AND LEG: Primary | ICD-10-CM

## 2021-10-05 DIAGNOSIS — I10 HYPERTENSION GOAL BP (BLOOD PRESSURE) < 140/90: ICD-10-CM

## 2021-10-05 PROCEDURE — 80048 BASIC METABOLIC PNL TOTAL CA: CPT | Performed by: NURSE PRACTITIONER

## 2021-10-05 PROCEDURE — 84443 ASSAY THYROID STIM HORMONE: CPT | Performed by: NURSE PRACTITIONER

## 2021-10-05 PROCEDURE — 85025 COMPLETE CBC W/AUTO DIFF WBC: CPT | Performed by: NURSE PRACTITIONER

## 2021-10-05 PROCEDURE — 36415 COLL VENOUS BLD VENIPUNCTURE: CPT

## 2021-10-05 PROCEDURE — 99214 OFFICE O/P EST MOD 30 MIN: CPT | Performed by: NURSE PRACTITIONER

## 2021-10-05 ASSESSMENT — ENCOUNTER SYMPTOMS
ENDOCRINE NEGATIVE: 1
HEMATOLOGIC/LYMPHATIC NEGATIVE: 1
GASTROINTESTINAL NEGATIVE: 1
WEAKNESS: 0
CONSTITUTIONAL NEGATIVE: 1
PARESTHESIAS: 1
CARDIOVASCULAR NEGATIVE: 1
MUSCULOSKELETAL NEGATIVE: 1
LIGHT-HEADEDNESS: 0
NUMBNESS: 0
TREMORS: 0
RESPIRATORY NEGATIVE: 1
DIZZINESS: 0
PSYCHIATRIC NEGATIVE: 1
EYES NEGATIVE: 1
SPEECH DIFFICULTY: 0
HEADACHES: 1

## 2021-10-05 NOTE — PATIENT INSTRUCTIONS
Labs today will come back in the next 1-2 days and will be available to view on my chart. If anything is abnormal, I will contact you.    I want to discuss your symptoms with your primary care provider and sent her a message. I will let you know what comes of out conversation.    Please see Dr. Mendez in 1-2 weeks for a recheck. This can be a virtual visit.    If you have any increase in symptoms, weakness, dizziness, change in vision or difficulty speaking call [phone]

## 2021-10-05 NOTE — TELEPHONE ENCOUNTER
Patient was seen by  provider today, Sanjuana Hairston, PANCHO.    Sanjuana is requesting to discuss this patient's visit today in the .    Routing to PCP, Dr. Mendez to review when able.      Sanjuana's cell number is in routing comments, and her office phone is [phone].   Please do not leave detailed message on this phone.    Felipa Brown RN, [facility]

## 2021-10-05 NOTE — PROGRESS NOTES
"    Assessment & Plan     Paresthesia of right arm and leg  Discussed with her PCP and feels MRI should be done along with labs today. Did add B12 to existing labs.   Called Ruth with plan. Will reach out to her primary clinic if radiology does not contact her in 24 hours to schedule MRI's.     - CBC with platelets differential; Future  - Basic metabolic panel; Future  - TSH with free T4 reflex; Future  - CBC with platelets differential  - Basic metabolic panel  - TSH with free T4 reflex  - Vitamin B12; Future  - MR Cervical Spine w/o Contrast; Future  - MR Brain w/o Contrast; Future    Hypertension goal BP (blood pressure) < 140/90  Blood pressure 120/65 today in clinic.     - Basic metabolic panel; Future  - Basic metabolic panel  - MR Cervical Spine w/o Contrast; Future  - MR Brain w/o Contrast; Future      40 minutes spent on the date of the encounter doing chart review, patient visit, documentation and discussion with other provider(s)        BMI:   Estimated body mass index is 28.67 kg/m  as calculated from the following:    Height as of 10/22/20: 1.626 m (5' 4\").    Weight as of this encounter: 75.8 kg (167 lb).       See Patient Instructions    Return in about 10 days (around 10/15/2021) for Follow up with your primary care provider for recheck. .    Sanjuana Hairston, [facility]    Subjective   Ruth is a 55 year old who presents for the following health issues     HPI     55 year old female presents to urgent care for tingling in the right leg and arm on and off for the past week.  She is also had headaches, however this is not new for her.  Headaches have not changed in nature at all.  Was bothersome to her is the tingling she feels on her right leg and arm.  She does have a history of hypertension, and recently increased her lisinopril to 5 mg daily under the guidance of her primary care provider.  States her blood pressures were 140s over the 80s consistently " prior to increasing her lisinopril.  Denies any chest pain, shortness of breath.  No change in vision, no ringing in the ears.  No dizziness, no weakness.  Does Pilates 6 days a week, denies any injury to the neck or back.  No past medical history of car accidents or back pains.  Was advised by her primary care provider to come in and be evaluated.    Review of Systems   Constitutional: Negative.    HENT: Negative.    Eyes: Negative.    Respiratory: Negative.    Cardiovascular: Negative.    Gastrointestinal: Negative.    Endocrine: Negative.    Musculoskeletal: Negative.    Neurological: Positive for headaches and paresthesias. Negative for dizziness, tremors, syncope, speech difficulty, weakness, light-headedness and numbness.   Hematological: Negative.    Psychiatric/Behavioral: Negative.             Objective    /65 (BP Location: Right arm, Patient Position: Chair, Cuff Size: Adult Regular)   Pulse 120   Temp 97.7  F (36.5  C) (Oral)   Wt 75.8 kg (167 lb)   SpO2 100%   BMI 28.67 kg/m    Body mass index is 28.67 kg/m .  Physical Exam  Constitutional:       General: She is not in acute distress.     Appearance: Normal appearance. She is not toxic-appearing.   HENT:      Head: Normocephalic and atraumatic.      Right Ear: Tympanic membrane, ear canal and external ear normal.      Left Ear: Tympanic membrane, ear canal and external ear normal.      Mouth/Throat:      Mouth: Mucous membranes are moist.      Pharynx: Oropharynx is clear.   Eyes:      General: No visual field deficit.     Extraocular Movements: Extraocular movements intact.      Conjunctiva/sclera: Conjunctivae normal.      Pupils: Pupils are equal, round, and reactive to light.   Neck:      Vascular: No carotid bruit.   Cardiovascular:      Rate and Rhythm: Normal rate and regular rhythm.      Heart sounds: Normal heart sounds. No murmur heard.     Pulmonary:      Effort: Pulmonary effort is normal. No respiratory distress.      Breath  sounds: Normal breath sounds.   Musculoskeletal:         General: Normal range of motion.      Cervical back: Normal range of motion and neck supple. No tenderness.   Lymphadenopathy:      Cervical: No cervical adenopathy.   Skin:     General: Skin is warm and dry.   Neurological:      General: No focal deficit present.      Mental Status: She is oriented to person, place, and time.      Cranial Nerves: No cranial nerve deficit, dysarthria or facial asymmetry.      Motor: No weakness, tremor, abnormal muscle tone or pronator drift.      Coordination: Coordination normal. Finger-Nose-Finger Test and Heel to Shin Test normal.      Gait: Gait is intact. Gait normal.      Deep Tendon Reflexes: Reflexes are normal and symmetric. Reflexes normal.      Comments: 1-2 point discrimination normal except for the right lateral upper and lower arm. Can feel 1 point when is actually 2.     Psychiatric:         Mood and Affect: Mood normal.         Thought Content: Thought content normal.

## 2021-10-05 NOTE — TELEPHONE ENCOUNTER
Called provider back.   126/60 bp.   Tingling in entire right arm and decreased 2 point discrimination decreased right arm but o/w nl neuro exam.   occ tingling right leg but none today    Provider wondering if I would rec MRI.     Plan- encourage mri of both brain and cervical spine. Add also B12 to labs.

## 2021-10-06 LAB
ANION GAP SERPL CALCULATED.3IONS-SCNC: 9 MMOL/L (ref 3–14)
BASOPHILS # BLD AUTO: 0 10E3/UL (ref 0–0.2)
BASOPHILS NFR BLD AUTO: 1 %
BUN SERPL-MCNC: 14 MG/DL (ref 7–30)
CALCIUM SERPL-MCNC: 8.8 MG/DL (ref 8.5–10.1)
CHLORIDE BLD-SCNC: 103 MMOL/L (ref 94–109)
CO2 SERPL-SCNC: 24 MMOL/L (ref 20–32)
CREAT SERPL-MCNC: 0.91 MG/DL (ref 0.52–1.04)
EOSINOPHIL # BLD AUTO: 0.1 10E3/UL (ref 0–0.7)
EOSINOPHIL NFR BLD AUTO: 1 %
ERYTHROCYTE [DISTWIDTH] IN BLOOD BY AUTOMATED COUNT: 12.9 % (ref 10–15)
GFR SERPL CREATININE-BSD FRML MDRD: 72 ML/MIN/1.73M2
GLUCOSE BLD-MCNC: 122 MG/DL (ref 70–99)
HCT VFR BLD AUTO: 43 % (ref 35–47)
HGB BLD-MCNC: 14.4 G/DL (ref 11.7–15.7)
IMM GRANULOCYTES # BLD: 0 10E3/UL
IMM GRANULOCYTES NFR BLD: 0 %
LYMPHOCYTES # BLD AUTO: 2.7 10E3/UL (ref 0.8–5.3)
LYMPHOCYTES NFR BLD AUTO: 34 %
MCH RBC QN AUTO: 30.1 PG (ref 26.5–33)
MCHC RBC AUTO-ENTMCNC: 33.5 G/DL (ref 31.5–36.5)
MCV RBC AUTO: 90 FL (ref 78–100)
MONOCYTES # BLD AUTO: 0.5 10E3/UL (ref 0–1.3)
MONOCYTES NFR BLD AUTO: 6 %
NEUTROPHILS # BLD AUTO: 4.8 10E3/UL (ref 1.6–8.3)
NEUTROPHILS NFR BLD AUTO: 58 %
NRBC # BLD AUTO: 0 10E3/UL
NRBC BLD AUTO-RTO: 0 /100
PLATELET # BLD AUTO: 352 10E3/UL (ref 150–450)
POTASSIUM BLD-SCNC: 3.9 MMOL/L (ref 3.4–5.3)
RBC # BLD AUTO: 4.78 10E6/UL (ref 3.8–5.2)
SODIUM SERPL-SCNC: 136 MMOL/L (ref 133–144)
TSH SERPL DL<=0.005 MIU/L-ACNC: 1.97 MU/L (ref 0.4–4)
WBC # BLD AUTO: 8.2 10E3/UL (ref 4–11)

## 2021-10-07 NOTE — PROGRESS NOTES
Ruth is a 55 year old  female who presents for annual exam.     Besides routine health maintenance, she has no other health concerns today .    HPI:  The patient's PCP is Renee Mendez MD.  Here for an annual exam. No menses in over 12 months.       GYNECOLOGIC HISTORY:    Patient's last menstrual period was 2020.    Her current contraception method is: vasectomy.  She  reports that she has never smoked. She has never used smokeless tobacco.    Patient is sexually active.  STD testing offered?  Declined     Last PHQ-9 score on record =   PHQ-9 SCORE 2021   PHQ-9 Total Score -   PHQ-9 Total Score 0     Last GAD7 score on record =   DAV-7 SCORE 2021   Total Score -   Total Score 0     Alcohol Score = 2    HEALTH MAINTENANCE:  Cholesterol:   Recent Labs   Lab Test 20  0935 19  1030 14  0843   CHOL 218* 225* 192   HDL 74 84 65   * 125* 98   TRIG 161* 79 143   CHOLHDLRATIO  --   --  3.0    < > = values in this interval not displayed.     Last Mammo: One year ago, Result: Normal, Next Mammo: Today   Pap:   Lab Results   Component Value Date    PAP NIL, HPV- 2017     Colonoscopy:  3/26/18, Result: Abnormal, Next Colonoscopy: 2 years.  Dexa:  NA    Health maintenance updated:  yes    HISTORY:  OB History    Para Term  AB Living   6 4 0 0 2 4   SAB TAB Ectopic Multiple Live Births   2 0 0 0 0      # Outcome Date GA Lbr Diego/2nd Weight Sex Delivery Anes PTL Lv   6 SAB  11w0d          5 SAB            4 Para            3 Para            2 Para            1 Para                Patient Active Problem List   Diagnosis     Hypertension goal BP (blood pressure) < 140/90     CARDIOVASCULAR SCREENING; LDL GOAL LESS THAN 160     GERD (gastroesophageal reflux disease)     Rhinitis     Microscopic hematuria     Hives     Past Surgical History:   Procedure Laterality Date     AS ENLARGE BREAST WITH IMPLANT       COLONOSCOPY WITH CO2 INSUFFLATION N/A  "3/26/2018    Procedure: COLONOSCOPY WITH CO2 INSUFFLATION;  Screen for colon cancer  BMI- 27.91  referred by: Renee Mendez MD  [facility] Fax# [phone]  Colonoscopy;  Surgeon: Michi Fernandez MD;  Location:  OR     SURGICAL HISTORY OF -       eye surgery as a child following a sledding injury      Social History     Tobacco Use     Smoking status: Never Smoker     Smokeless tobacco: Never Used   Substance Use Topics     Alcohol use: Yes     Alcohol/week: 0.0 standard drinks     Comment: 4 glasses wine per week.       Problem (# of Occurrences) Relation (Name,Age of Onset)    Breast Cancer (1) Other    Cancer (1) Paternal Grandmother: throat cancer    Cancer - colorectal (1) Other: uncle (I didn't ask whether maternal or paternal), fatal in 60s    Neurologic Disorder (1) Mother: parkinsons onset at 66    Respiratory (1) Father: lung disease, former smoker, fatal age histiocystosis, lawn chem,  age 62       Negative family history of: Asthma, C.A.D., Diabetes, Hypertension, Cerebrovascular Disease, Prostate Cancer            Current Outpatient Medications   Medication Sig     lisinopril (ZESTRIL) 10 MG tablet Take 1 tablet (10 mg) by mouth daily     No current facility-administered medications for this visit.     No Known Allergies    Past medical, surgical, social and family histories were reviewed and updated in EPIC.    ROS:   12 point review of systems negative other than symptoms noted below or in the HPI.  No urinary frequency or dysuria, bladder or kidney problems    EXAM:  /72   Ht 1.6 m (5' 3\")   Wt 73.5 kg (162 lb)   LMP 2020   BMI 28.70 kg/m     BMI: Body mass index is 28.7 kg/m .    PHYSICAL EXAM:  Constitutional:   Appearance: Well nourished, well developed, alert, in no acute distress  Neck:  Lymph Nodes:  No lymphadenopathy present    Thyroid:  Gland size normal, nontender, no nodules or masses present on palpation  Chest:  Respiratory Effort:  Breathing " unlabored, CTAB  Cardiovascular:    Heart: Auscultation:  Regular rate, normal rhythm, no murmurs present  Breasts: Inspection of Breasts:  No lymphadenopathy present., Palpation of Breasts and Axillae:  No masses present on palpation, no breast tenderness., Axillary Lymph Nodes:  No lymphadenopathy present., No nodularity, asymmetry or nipple discharge bilaterally. and breast implants present.  Gastrointestinal:   Abdominal Examination:  Abdomen nontender to palpation, tone normal without rigidity or guarding, no masses present, umbilicus without lesions   Liver and Spleen:  No hepatomegaly present, liver nontender to palpation    Hernias:  No hernias present  Lymphatic: Lymph Nodes:  No other lymphadenopathy present  Skin:  General Inspection:  No rashes present, no lesions present, no areas of  discoloration  Neurologic:    Mental Status:  Oriented X3.  Normal strength and tone, sensory exam                grossly normal, mentation intact and speech normal.    Psychiatric:   Mentation appears normal and affect normal/bright.         Pelvic Exam:  External Genitalia:     Normal appearance for age, no discharge present, no tenderness present, no inflammatory lesions present, color normal  Vagina:     Normal vaginal vault without central or paravaginal defects, no discharge present, no inflammatory lesions present, no masses present  Bladder:     Nontender to palpation  Urethra:   Urethral Body:  Urethra palpation normal, urethra structural support normal   Urethral Meatus:  No erythema or lesions present  Cervix:     Appearance healthy, no lesions present, nontender to palpation, no bleeding present  Uterus:     Uterus: firm, normal sized and nontender, anteverted in position.   Adnexa:     No adnexal tenderness present, no adnexal masses present  Perineum:     Perineum within normal limits, no evidence of trauma, no rashes or skin lesions present  Anus:     Anus within normal limits, no hemorrhoids  present  Inguinal Lymph Nodes:     No lymphadenopathy present  Pubic Hair:     Normal pubic hair distribution for age  Genitalia and Groin:     No rashes present, no lesions present, no areas of discoloration, no masses present      COUNSELING:   Reviewed preventive health counseling, as reflected in patient instructions    BMI: Body mass index is 28.7 kg/m .      ASSESSMENT:  55 year old female with satisfactory annual exam.    ICD-10-CM    1. Encounter for gynecological examination without abnormal finding  Z01.419 Pap screen with HPV - recommended age 30 - 65 years   2. Screening for cervical cancer  Z12.4 Pap screen with HPV - recommended age 30 - 65 years       PLAN:  Pap smear done today  Mammogram today  Continue screenings via PCP and management of hypertension.    Aidee Patel MD

## 2021-10-09 DIAGNOSIS — I10 BENIGN ESSENTIAL HYPERTENSION: ICD-10-CM

## 2021-10-09 RX ORDER — LISINOPRIL 5 MG/1
5 TABLET ORAL DAILY
Qty: 90 TABLET | Refills: 3 | Status: CANCELLED | OUTPATIENT
Start: 2021-10-09

## 2021-10-11 RX ORDER — LISINOPRIL 10 MG/1
10 TABLET ORAL DAILY
Qty: 30 TABLET | Refills: 0 | Status: SHIPPED | OUTPATIENT
Start: 2021-10-11 | End: 2021-10-21

## 2021-10-11 RX ORDER — LISINOPRIL 5 MG/1
5 TABLET ORAL DAILY
Qty: 90 TABLET | Refills: 3 | Status: CANCELLED | OUTPATIENT
Start: 2021-10-11

## 2021-10-11 NOTE — TELEPHONE ENCOUNTER
Per further review of the chart, patient was seen in walk-in/UC clinic at [facility] on 10/5/21 for acute visit.   Patient is following with Dr. Mendez.  PCP is aware of patient's increase in Lisinopril dose to 10 mg daily (see 10/4/21 MyC encounter in Chart Review).    Pharmacist requesting new prescription for 10 mg tab, to at least last patient till her upcoming visit with PCP on 11/11/21.    Routing to PCP to review and advise, medication T'd up for review as 10 mg dosage.      Yuli Carter RN  [facility]

## 2021-10-11 NOTE — TELEPHONE ENCOUNTER
Pt seen by TRENA Hairston NP on 10/5/21.  Can you take over prescribing this med?  Margie Child BSN, RN

## 2021-10-21 ENCOUNTER — OFFICE VISIT (OUTPATIENT)
Dept: FAMILY MEDICINE | Facility: CLINIC | Age: 56
End: 2021-10-21
Payer: COMMERCIAL

## 2021-10-21 VITALS
HEART RATE: 81 BPM | SYSTOLIC BLOOD PRESSURE: 110 MMHG | RESPIRATION RATE: 18 BRPM | WEIGHT: 167 LBS | OXYGEN SATURATION: 99 % | DIASTOLIC BLOOD PRESSURE: 62 MMHG | BODY MASS INDEX: 28.67 KG/M2

## 2021-10-21 DIAGNOSIS — R20.2 PARESTHESIA OF RIGHT ARM AND LEG: Primary | ICD-10-CM

## 2021-10-21 DIAGNOSIS — Z13.1 SCREENING FOR DIABETES MELLITUS: ICD-10-CM

## 2021-10-21 DIAGNOSIS — I10 BENIGN ESSENTIAL HYPERTENSION: ICD-10-CM

## 2021-10-21 DIAGNOSIS — Z13.220 SCREENING CHOLESTEROL LEVEL: ICD-10-CM

## 2021-10-21 DIAGNOSIS — Z23 NEED FOR PROPHYLACTIC VACCINATION AND INOCULATION AGAINST INFLUENZA: ICD-10-CM

## 2021-10-21 PROBLEM — L50.9 HIVES: Status: ACTIVE | Noted: 2021-10-21

## 2021-10-21 LAB
CHOLEST SERPL-MCNC: 225 MG/DL
FASTING STATUS PATIENT QL REPORTED: YES
FOLATE SERPL-MCNC: 24.9 NG/ML
HDLC SERPL-MCNC: 69 MG/DL
LDLC SERPL CALC-MCNC: 133 MG/DL
NONHDLC SERPL-MCNC: 156 MG/DL
TRIGL SERPL-MCNC: 115 MG/DL
VIT B12 SERPL-MCNC: 264 PG/ML (ref 193–986)

## 2021-10-21 PROCEDURE — 82947 ASSAY GLUCOSE BLOOD QUANT: CPT | Performed by: FAMILY MEDICINE

## 2021-10-21 PROCEDURE — 82607 VITAMIN B-12: CPT | Performed by: FAMILY MEDICINE

## 2021-10-21 PROCEDURE — 90472 IMMUNIZATION ADMIN EACH ADD: CPT | Performed by: FAMILY MEDICINE

## 2021-10-21 PROCEDURE — 90750 HZV VACC RECOMBINANT IM: CPT | Performed by: FAMILY MEDICINE

## 2021-10-21 PROCEDURE — 99214 OFFICE O/P EST MOD 30 MIN: CPT | Mod: 25 | Performed by: FAMILY MEDICINE

## 2021-10-21 PROCEDURE — 36415 COLL VENOUS BLD VENIPUNCTURE: CPT | Performed by: FAMILY MEDICINE

## 2021-10-21 PROCEDURE — 90471 IMMUNIZATION ADMIN: CPT | Performed by: FAMILY MEDICINE

## 2021-10-21 PROCEDURE — 86618 LYME DISEASE ANTIBODY: CPT | Performed by: FAMILY MEDICINE

## 2021-10-21 PROCEDURE — 90682 RIV4 VACC RECOMBINANT DNA IM: CPT | Performed by: FAMILY MEDICINE

## 2021-10-21 PROCEDURE — 80061 LIPID PANEL: CPT | Performed by: FAMILY MEDICINE

## 2021-10-21 PROCEDURE — 82746 ASSAY OF FOLIC ACID SERUM: CPT | Performed by: FAMILY MEDICINE

## 2021-10-21 RX ORDER — LISINOPRIL 10 MG/1
10 TABLET ORAL DAILY
Qty: 90 TABLET | Refills: 3 | Status: SHIPPED | OUTPATIENT
Start: 2021-10-21 | End: 2022-12-13

## 2021-10-21 NOTE — RESULT ENCOUNTER NOTE
Ruth,  Your vitamin B12 level was fairly low normal range. I'm wondering if this is contributing to your symptoms.  Let's have you start a daily vitamin B12 supplement dosing at 1,000 mcg daily and see if this starts to help.    I would also still get the mri imaging completed   Kind regards,  Renee Mendez MD

## 2021-10-21 NOTE — PROGRESS NOTES
"  Assessment & Plan     Paresthesia of right arm and leg  Certainly concerning that this is just right-sided.  Strongly encouraged her to get follow-up MRI scans of brain and cervical spine and reviewed concerns that I would like to rule out.  Thankfully her neurologic exam is normal today with no localizing symptoms.  Given her symptoms seem to occur with more aggressive Pilates exercising recommended consideration of crosstraining at this point to see if this will improve her symptoms.  Also discussed changing bed as she does question if her discomfort in her back could be contributing.  Will get labs as noted below.  Certainly if not improving would refer on to neurology and reviewed importance of being seen emergently if any progressive neurologic symptoms occurring.  - Vitamin B12; Future  - Lyme Disease Gabriella with reflex to WB Serum; Future  - Folate; Future  - Folate  - Lyme Disease Gabriella with reflex to WB Serum  - Vitamin B12    Benign essential hypertension  At goal with increase lisinopril dose to 10 mg.  We will continue current dosing  - lisinopril (ZESTRIL) 10 MG tablet; Take 1 tablet (10 mg) by mouth daily    Need for prophylactic vaccination and inoculation against influenza  - INFLUENZA QUAD, RECOMBINANT, P-FREE (RIV4) (FLUBLOK)    Screening cholesterol level  - Lipid panel reflex to direct LDL Fasting; Future  - Lipid panel reflex to direct LDL Fasting         BMI:   Estimated body mass index is 28.67 kg/m  as calculated from the following:    Height as of 10/22/20: 1.626 m (5' 4\").    Weight as of this encounter: 75.8 kg (167 lb).   Weight management plan: Encouraged continued healthy diet and exercise    Patient Instructions   Schedule MRI   Labs today  If persistent or worsening symptoms would consider neurology visit.   Start multi-vitamin  Consider trying a different bed.     Bring us a copy of your health directive      Return in about 1 year (around 10/21/2022).    Renee Mendez, " MD [facility] ANILA Miranda is a 55 year old who presents for the following health issues  HPI     Hypertension Follow-up      Do you check your blood pressure regularly outside of the clinic? Yes     Are you following a low salt diet? Yes    Are your blood pressures ever more than 140 on the top number (systolic) OR more   than 90 on the bottom number (diastolic), for example 140/90? Better since increasing dose to 10 mg of lisinopril      How many servings of fruits and vegetables do you eat daily?  4 or more    On average, how many sweetened beverages do you drink each day (Examples: soda, juice, sweet tea, etc.  Do NOT count diet or artificially sweetened beverages)?   0    How many days per week do you exercise enough to make your heart beat faster? 6    How many minutes a day do you exercise enough to make your heart beat faster? 30 - 60    How many days per week do you miss taking your medication? 0    Reviewed recent urgent care note. Has not yet completed mri scans yet as ordered.     Sx's started happening when started exercising a lot. Present for a while (at least a month) but not really sure how long it was present as might be more. Doing pilates 6 days a week.   Slight tingling sensation in right extremeties (feeling like a foot is falling asleep). First started noticing this in the shower. Not sure exactly where she is feeling in the arm or leg, diffuse, nebulous  Sx's present most days but intermittent. Generally right side. Both arm and leg.   No weakness, dizziness, vision/hearing/speech changes    Hx of chronic lifelong headaches. No recent change. Headaches at least 2 x's a week.   No dizziness,    Feel stronger than she has her entire life with working out so much.      with lyme disease this summer- he is a golfer. She doesn't feel much risk for herself    bp was running high this fall - systolic   Bumped up the lisinpril to 10 mg and has been normal  since then   Eating good diet. Drinking less wine.     Stomach sleeper but has been on her side more as low back back hurts sometimes in bed. Wonders if need new mattress.     appt with gyn for pap.        Review of Systems   Constitutional, HEENT, cardiovascular, pulmonary, gi and gu systems are negative, except as otherwise noted.      Objective    /62   Pulse 81   Resp 18   Wt 75.8 kg (167 lb)   SpO2 99%   BMI 28.67 kg/m    Body mass index is 28.67 kg/m .  Physical Exam   GENERAL: healthy, alert and no distress  NECK: no adenopathy, no asymmetry, masses, or scars and thyroid normal to palpation  RESP: lungs clear to auscultation - no rales, rhonchi or wheezes  CV: regular rate and rhythm, normal S1 S2, no S3 or S4, no murmur, click or rub, no peripheral edema and peripheral pulses strong  MS: no gross musculoskeletal defects noted, no edema  NEURO: Normal strength and tone, sensory exam grossly normal, mentation intact, speech normal, cranial nerves 2-12 intact, gait normal including heel/toe/tandem walking and Romberg normal  PSYCH: mentation appears normal, affect normal/bright    Office Visit on 10/05/2021   Component Date Value Ref Range Status     Sodium 10/05/2021 136  133 - 144 mmol/L Final     Potassium 10/05/2021 3.9  3.4 - 5.3 mmol/L Final     Chloride 10/05/2021 103  94 - 109 mmol/L Final     Carbon Dioxide (CO2) 10/05/2021 24  20 - 32 mmol/L Final     Anion Gap 10/05/2021 9  3 - 14 mmol/L Final     Urea Nitrogen 10/05/2021 14  7 - 30 mg/dL Final     Creatinine 10/05/2021 0.91  0.52 - 1.04 mg/dL Final     Calcium 10/05/2021 8.8  8.5 - 10.1 mg/dL Final     Glucose 10/05/2021 122* 70 - 99 mg/dL Final     GFR Estimate 10/05/2021 72  >60 mL/min/1.73m2 Final    As of July 11, 2021, eGFR is calculated by the CKD-EPI creatinine equation, without race adjustment. eGFR can be influenced by muscle mass, exercise, and diet. The reported eGFR is an estimation only and is only applicable if the renal  function is stable.     TSH 10/05/2021 1.97  0.40 - 4.00 mU/L Final     WBC Count 10/05/2021 8.2  4.0 - 11.0 10e3/uL Final     RBC Count 10/05/2021 4.78  3.80 - 5.20 10e6/uL Final     Hemoglobin 10/05/2021 14.4  11.7 - 15.7 g/dL Final     Hematocrit 10/05/2021 43.0  35.0 - 47.0 % Final     MCV 10/05/2021 90  78 - 100 fL Final     MCH 10/05/2021 30.1  26.5 - 33.0 pg Final     MCHC 10/05/2021 33.5  31.5 - 36.5 g/dL Final     RDW 10/05/2021 12.9  10.0 - 15.0 % Final     Platelet Count 10/05/2021 352  150 - 450 10e3/uL Final     % Neutrophils 10/05/2021 58  % Final     % Lymphocytes 10/05/2021 34  % Final     % Monocytes 10/05/2021 6  % Final     % Eosinophils 10/05/2021 1  % Final     % Basophils 10/05/2021 1  % Final     % Immature Granulocytes 10/05/2021 0  % Final     NRBCs per 100 WBC 10/05/2021 0  <1 /100 Final     Absolute Neutrophils 10/05/2021 4.8  1.6 - 8.3 10e3/uL Final     Absolute Lymphocytes 10/05/2021 2.7  0.8 - 5.3 10e3/uL Final     Absolute Monocytes 10/05/2021 0.5  0.0 - 1.3 10e3/uL Final     Absolute Eosinophils 10/05/2021 0.1  0.0 - 0.7 10e3/uL Final     Absolute Basophils 10/05/2021 0.0  0.0 - 0.2 10e3/uL Final     Absolute Immature Granulocytes 10/05/2021 0.0  <=0.0 10e3/uL Final     Absolute NRBCs 10/05/2021 0.0  10e3/uL Final

## 2021-10-21 NOTE — NURSING NOTE
Prior to immunization administration, verified patients identity using patient s name and date of birth. Please see Immunization Activity for additional information.     Screening Questionnaire for Adult Immunization    Are you sick today?   No   Do you have allergies to medications, food, a vaccine component or latex?   No   Have you ever had a serious reaction after receiving a vaccination?   No   Do you have a long-term health problem with heart, lung, kidney, or metabolic disease (e.g., diabetes), asthma, a blood disorder, no spleen, complement component deficiency, a cochlear implant, or a spinal fluid leak?  Are you on long-term aspirin therapy?   No   Do you have cancer, leukemia, HIV/AIDS, or any other immune system problem?   No   Do you have a parent, brother, or sister with an immune system problem?   No   In the past 3 months, have you taken medications that affect  your immune system, such as prednisone, other steroids, or anticancer drugs; drugs for the treatment of rheumatoid arthritis, Crohn s disease, or psoriasis; or have you had radiation treatments?   No   Have you had a seizure, or a brain or other nervous system problem?   No   During the past year, have you received a transfusion of blood or blood    products, or been given immune (gamma) globulin or antiviral drug?   No   For women: Are you pregnant or is there a chance you could become       pregnant during the next month?   No   Have you received any vaccinations in the past 4 weeks?   No     Immunization questionnaire answers were all negative.        Per orders of Dr. Mendez injection of Flublok and Zooster given by Brigitte Coley. Patient instructed to remain in clinic for 15 minutes afterwards, and to report any adverse reaction to me immediately.       Screening performed by Brigitte Coley on 10/21/2021 at 7:48 AM.

## 2021-10-21 NOTE — PATIENT INSTRUCTIONS
Schedule MRI   Labs today  If persistent or worsening symptoms would consider neurology visit.   Start multi-vitamin  Consider trying a different bed.     Bring us a copy of your health directive

## 2021-10-21 NOTE — LETTER
November 2, 2021      Ruth Resendez  [address]  [facility] [address]            Ruth,  It was a pleasure to see you in the office recently.  The remainder of the lab results are back.  Nothing further was seen to explain your current symptoms.  -The Lyme disease screening test was negative.  -The folic acid level was normal.  -Your cholesterol panel continues to show excellent good cholesterol levels (HDL) and normal triglycerides.  However, the LDL (bad cholesterol) has crept up a bit.  Recommendations to reduce cholesterol and cardiovascular risks:  Diet:  -Try to eat more vegetables, fruits, legumes, nuts/seeds, whole grains, and fish.  - try to eat less red meat, processed meats, processed foods, sweetened beverages.   - try to replace saturated fat in your diet with mono- and poly-unsaturated fats   Exercise:  Aim for regular exercise with a goal of 150 min of moderate to high intensity aerobic exercise per week    Please MyChart or call if you have any concerns or questions. Please continue with the plan we developed in the office.   Sincerely,  Renee Mendez MD    Resulted Orders   Lyme Disease Gabriella with reflex to WB Serum   Result Value Ref Range    Lyme Disease Antibodies Total 0.28 <0.90      Comment:      Negative, Absence of detectable Borrelia burdorferi antibodies. A negative result does not exclude the possibility of Borrelia burgdorferi infection. If early Lyme disease is suspected, a second sample should be collected and tested 2 to 4 weeks later.   Vitamin B12   Result Value Ref Range    Vitamin B12 264 193 - 986 pg/mL

## 2021-10-22 DIAGNOSIS — Z13.1 SCREENING FOR DIABETES MELLITUS: ICD-10-CM

## 2021-10-22 DIAGNOSIS — R20.2 PARESTHESIA OF RIGHT ARM AND LEG: Primary | ICD-10-CM

## 2021-10-22 LAB
B BURGDOR IGG+IGM SER QL: 0.28
GLUCOSE BLD-MCNC: 102 MG/DL (ref 70–99)

## 2021-10-22 NOTE — RESULT ENCOUNTER NOTE
Danial Miranda,  I added a glucose level to your labs to screen for diabetes. The results show the fasting blood sugar is slightly elevated in the pre-diabetic range (100-125). You are not currently diabetic, but at risk for developing this disease in the future. Exercise, weight loss, and moderating the carbohydrates in your diet will help to lower your blood sugar levels and prevent the onset of diabetes. We will continue to monitor your blood sugar annually, but please be seen sooner  if you develop any new signs or symptoms of diabetes (increase thirst or urination, fatigue, blurry vision, unexplained weight loss).    Kind regards,  Renee Mendez MD

## 2021-10-22 NOTE — RESULT ENCOUNTER NOTE
Ruth,  It was a pleasure to see you in the office recently.  The remainder of the lab results are back.  Nothing further was seen to explain your current symptoms.  -The Lyme disease screening test was negative.  -The folic acid level was normal.  -Your cholesterol panel continues to show excellent good cholesterol levels (HDL) and normal triglycerides.  However, the LDL (bad cholesterol) has crept up a bit.  Recommendations to reduce cholesterol and cardiovascular risks:  Diet:  -Try to eat more vegetables, fruits, legumes, nuts/seeds, whole grains, and fish.  - try to eat less red meat, processed meats, processed foods, sweetened beverages.   - try to replace saturated fat in your diet with mono- and poly-unsaturated fats   Exercise:  Aim for regular exercise with a goal of 150 min of moderate to high intensity aerobic exercise per week    Please MyChart or call if you have any concerns or questions. Please continue with the plan we developed in the office.   Sincerely,  Renee Mendez MD

## 2021-10-29 ENCOUNTER — MYC MEDICAL ADVICE (OUTPATIENT)
Dept: FAMILY MEDICINE | Facility: CLINIC | Age: 56
End: 2021-10-29

## 2021-11-01 ENCOUNTER — ANCILLARY PROCEDURE (OUTPATIENT)
Dept: MAMMOGRAPHY | Facility: CLINIC | Age: 56
End: 2021-11-01
Payer: COMMERCIAL

## 2021-11-01 ENCOUNTER — OFFICE VISIT (OUTPATIENT)
Dept: OBGYN | Facility: CLINIC | Age: 56
End: 2021-11-01
Payer: COMMERCIAL

## 2021-11-01 VITALS
HEIGHT: 63 IN | DIASTOLIC BLOOD PRESSURE: 72 MMHG | SYSTOLIC BLOOD PRESSURE: 120 MMHG | BODY MASS INDEX: 28.7 KG/M2 | WEIGHT: 162 LBS

## 2021-11-01 DIAGNOSIS — Z12.31 VISIT FOR SCREENING MAMMOGRAM: ICD-10-CM

## 2021-11-01 DIAGNOSIS — Z01.419 ENCOUNTER FOR GYNECOLOGICAL EXAMINATION WITHOUT ABNORMAL FINDING: Primary | ICD-10-CM

## 2021-11-01 DIAGNOSIS — Z12.4 SCREENING FOR CERVICAL CANCER: ICD-10-CM

## 2021-11-01 PROCEDURE — 77063 BREAST TOMOSYNTHESIS BI: CPT | Mod: TC | Performed by: RADIOLOGY

## 2021-11-01 PROCEDURE — 99396 PREV VISIT EST AGE 40-64: CPT | Performed by: OBSTETRICS & GYNECOLOGY

## 2021-11-01 PROCEDURE — G0145 SCR C/V CYTO,THINLAYER,RESCR: HCPCS | Performed by: OBSTETRICS & GYNECOLOGY

## 2021-11-01 PROCEDURE — 87624 HPV HI-RISK TYP POOLED RSLT: CPT | Performed by: OBSTETRICS & GYNECOLOGY

## 2021-11-01 PROCEDURE — 77067 SCR MAMMO BI INCL CAD: CPT | Mod: TC | Performed by: RADIOLOGY

## 2021-11-01 ASSESSMENT — ANXIETY QUESTIONNAIRES
1. FEELING NERVOUS, ANXIOUS, OR ON EDGE: NOT AT ALL
2. NOT BEING ABLE TO STOP OR CONTROL WORRYING: NOT AT ALL
5. BEING SO RESTLESS THAT IT IS HARD TO SIT STILL: NOT AT ALL
3. WORRYING TOO MUCH ABOUT DIFFERENT THINGS: NOT AT ALL
7. FEELING AFRAID AS IF SOMETHING AWFUL MIGHT HAPPEN: NOT AT ALL
GAD7 TOTAL SCORE: 0
6. BECOMING EASILY ANNOYED OR IRRITABLE: NOT AT ALL

## 2021-11-01 ASSESSMENT — MIFFLIN-ST. JEOR: SCORE: 1303.96

## 2021-11-01 ASSESSMENT — PATIENT HEALTH QUESTIONNAIRE - PHQ9
5. POOR APPETITE OR OVEREATING: NOT AT ALL
SUM OF ALL RESPONSES TO PHQ QUESTIONS 1-9: 0

## 2021-11-02 ASSESSMENT — ANXIETY QUESTIONNAIRES: GAD7 TOTAL SCORE: 0

## 2021-11-03 LAB
BKR LAB AP GYN ADEQUACY: NORMAL
BKR LAB AP GYN INTERPRETATION: NORMAL
BKR LAB AP HPV REFLEX: NORMAL
BKR LAB AP PREVIOUS ABNORMAL: NORMAL
PATH REPORT.COMMENTS IMP SPEC: NORMAL
PATH REPORT.RELEVANT HX SPEC: NORMAL

## 2021-11-05 LAB
HUMAN PAPILLOMA VIRUS 16 DNA: NEGATIVE
HUMAN PAPILLOMA VIRUS 18 DNA: NEGATIVE
HUMAN PAPILLOMA VIRUS FINAL DIAGNOSIS: NORMAL
HUMAN PAPILLOMA VIRUS OTHER HR: NEGATIVE

## 2021-11-12 ENCOUNTER — HOSPITAL ENCOUNTER (OUTPATIENT)
Dept: MRI IMAGING | Facility: CLINIC | Age: 56
End: 2021-11-12
Attending: NURSE PRACTITIONER
Payer: COMMERCIAL

## 2021-11-12 DIAGNOSIS — R20.2 PARESTHESIA OF RIGHT ARM AND LEG: ICD-10-CM

## 2021-11-12 DIAGNOSIS — I10 HYPERTENSION GOAL BP (BLOOD PRESSURE) < 140/90: ICD-10-CM

## 2021-11-12 PROCEDURE — 70551 MRI BRAIN STEM W/O DYE: CPT

## 2021-11-12 PROCEDURE — 72141 MRI NECK SPINE W/O DYE: CPT

## 2022-10-15 ENCOUNTER — HEALTH MAINTENANCE LETTER (OUTPATIENT)
Age: 57
End: 2022-10-15

## 2022-11-02 ENCOUNTER — TELEPHONE (OUTPATIENT)
Dept: FAMILY MEDICINE | Facility: CLINIC | Age: 57
End: 2022-11-02

## 2022-11-23 ENCOUNTER — TRANSFERRED RECORDS (OUTPATIENT)
Dept: HEALTH INFORMATION MANAGEMENT | Facility: CLINIC | Age: 57
End: 2022-11-23

## 2022-11-29 ENCOUNTER — TRANSFERRED RECORDS (OUTPATIENT)
Dept: HEALTH INFORMATION MANAGEMENT | Facility: CLINIC | Age: 57
End: 2022-11-29

## 2022-12-05 ENCOUNTER — MYC MEDICAL ADVICE (OUTPATIENT)
Dept: FAMILY MEDICINE | Facility: CLINIC | Age: 57
End: 2022-12-05

## 2022-12-05 DIAGNOSIS — E53.8 LOW SERUM VITAMIN B12: Primary | ICD-10-CM

## 2022-12-06 ENCOUNTER — ANCILLARY PROCEDURE (OUTPATIENT)
Dept: MAMMOGRAPHY | Facility: CLINIC | Age: 57
End: 2022-12-06
Attending: FAMILY MEDICINE
Payer: COMMERCIAL

## 2022-12-06 DIAGNOSIS — Z12.31 VISIT FOR SCREENING MAMMOGRAM: ICD-10-CM

## 2022-12-06 PROCEDURE — 77063 BREAST TOMOSYNTHESIS BI: CPT | Mod: TC | Performed by: RADIOLOGY

## 2022-12-06 PROCEDURE — 77067 SCR MAMMO BI INCL CAD: CPT | Mod: TC | Performed by: RADIOLOGY

## 2022-12-13 ENCOUNTER — DOCUMENTATION ONLY (OUTPATIENT)
Dept: LAB | Facility: CLINIC | Age: 57
End: 2022-12-13

## 2022-12-13 ENCOUNTER — VIRTUAL VISIT (OUTPATIENT)
Dept: FAMILY MEDICINE | Facility: CLINIC | Age: 57
End: 2022-12-13
Payer: COMMERCIAL

## 2022-12-13 ENCOUNTER — TRANSFERRED RECORDS (OUTPATIENT)
Dept: HEALTH INFORMATION MANAGEMENT | Facility: CLINIC | Age: 57
End: 2022-12-13

## 2022-12-13 DIAGNOSIS — E28.39 ESTROGEN DEFICIENCY: ICD-10-CM

## 2022-12-13 DIAGNOSIS — Z13.220 SCREENING FOR HYPERLIPIDEMIA: ICD-10-CM

## 2022-12-13 DIAGNOSIS — I10 BENIGN ESSENTIAL HYPERTENSION: ICD-10-CM

## 2022-12-13 DIAGNOSIS — I10 HYPERTENSION GOAL BP (BLOOD PRESSURE) < 140/90: ICD-10-CM

## 2022-12-13 DIAGNOSIS — R73.01 ELEVATED FASTING GLUCOSE: Primary | ICD-10-CM

## 2022-12-13 DIAGNOSIS — Z12.11 SCREEN FOR COLON CANCER: ICD-10-CM

## 2022-12-13 DIAGNOSIS — M25.559 HIP PAIN: ICD-10-CM

## 2022-12-13 DIAGNOSIS — I10 HYPERTENSION GOAL BP (BLOOD PRESSURE) < 140/90: Primary | ICD-10-CM

## 2022-12-13 PROCEDURE — 99214 OFFICE O/P EST MOD 30 MIN: CPT | Mod: GT | Performed by: FAMILY MEDICINE

## 2022-12-13 RX ORDER — LISINOPRIL 10 MG/1
10 TABLET ORAL DAILY
Qty: 90 TABLET | Refills: 3 | Status: SHIPPED | OUTPATIENT
Start: 2022-12-13 | End: 2023-10-23

## 2022-12-13 NOTE — PROGRESS NOTES
Patient have a lab appointment 12-15-22, but there is no diagnose code for A1C . Could you order please if needed.  Thank you,  Nessa Purdy MLT(ASCP)

## 2022-12-13 NOTE — PROGRESS NOTES
Ruth is a 57 year old who is being evaluated via a billable video visit.      How would you like to obtain your AVS? MyChart  If the video visit is dropped, the invitation should be resent by: Text to cell phone: [phone]  Will anyone else be joining your video visit? No      Assessment & Plan     Hypertension goal BP (blood pressure) < 140/90  At goal, tolerating meds. Lisinopril refilled. Continue same     Screen for colon cancer  Due for colonoscopy in March  - Colonoscopy Screening  Referral; Future    Estrogen deficiency  Recent wrist fx with fall. Has not had baseline dexa scan yet. Reviewed bone health hx  - DX Hip/Pelvis/Spine; Future    Hip pain  Morning and post sitting. Resolves quickly. ? Arthritis vs impingement/labral issues. Discussed would start with xray and therapy but she wants to monitor as sx's mild right now. Consider also ortho referral and follow up prn      She has visit planned for fasting labs.     Return in about 1 year (around 12/13/2023) for Routine preventive, med check.    Renee Mendez MD  [facility]   Ruth is a 57 year old, presenting for the following health issues:  Recheck Medication      History of Present Illness       Hypertension: She presents for follow up of hypertension.  She does check blood pressure  regularly outside of the clinic. Outpatient blood pressures have not been over 140/90. She follows a low salt diet.         Hypertension Follow-up      Do you check your blood pressure regularly outside of the clinic? Yes     Are you following a low salt diet? Yes    Are your blood pressures ever more than 140 on the top number (systolic) OR more   than 90 on the bottom number (diastolic), for example 140/90? No      Medication Followup of LISINOPRIL    Taking Medication as prescribed: yes    Side Effects:  None    Medication Helping Symptoms:  yes    Overall feeling good  pilates 5-6 x's per week prior to  injury  Broke arm right prior to Thanksgiving, casted. Healing as anticipated.     Home bp readings.     appt for labs on Thursday.     Calcium- no milk but does have cottage cheese, cheese, yogart.     Right hip has been painful. Hurts when first get up, limp a bit.  Will also notice after sitting for prolonged time.   Pain feels deep inside the hip.         Review of Systems   Constitutional, HEENT, cardiovascular, pulmonary, gi and gu systems are negative, except as otherwise noted.      Objective           Vitals:  No vitals were obtained today due to virtual visit.    Physical Exam   GENERAL: Healthy, alert and no distress  EYES: Eyes grossly normal to inspection.  No discharge or erythema, or obvious scleral/conjunctival abnormalities.  RESP: No audible wheeze, cough, or visible cyanosis.  No visible retractions or increased work of breathing.    SKIN: Visible skin clear. No significant rash, abnormal pigmentation or lesions.  NEURO: Cranial nerves grossly intact.  Mentation and speech appropriate for age.  PSYCH: Mentation appears normal, affect normal/bright, judgement and insight intact, normal speech and appearance well-groomed.                Video-Visit Details    Video Start Time: 10:29 AM    Type of service:  Video Visit    Video End Time:10:46 AM    Originating Location (pt. Location): Home        Distant Location (provider location):  Off-site    Platform used for Video Visit: "[employer]

## 2022-12-13 NOTE — PATIENT INSTRUCTIONS
Calcium 1200 mg per day  Vitamin D 800-1000 international unit(s) per day    Schedule bone density scan and colonoscopy

## 2022-12-15 ENCOUNTER — LAB (OUTPATIENT)
Dept: LAB | Facility: CLINIC | Age: 57
End: 2022-12-15
Payer: COMMERCIAL

## 2022-12-15 DIAGNOSIS — Z13.220 SCREENING FOR HYPERLIPIDEMIA: ICD-10-CM

## 2022-12-15 DIAGNOSIS — I10 HYPERTENSION GOAL BP (BLOOD PRESSURE) < 140/90: ICD-10-CM

## 2022-12-15 DIAGNOSIS — E53.8 LOW SERUM VITAMIN B12: ICD-10-CM

## 2022-12-15 DIAGNOSIS — R73.01 ELEVATED FASTING GLUCOSE: ICD-10-CM

## 2022-12-15 LAB
ANION GAP SERPL CALCULATED.3IONS-SCNC: 6 MMOL/L (ref 3–14)
BUN SERPL-MCNC: 10 MG/DL (ref 7–30)
CALCIUM SERPL-MCNC: 9.5 MG/DL (ref 8.5–10.1)
CHLORIDE BLD-SCNC: 105 MMOL/L (ref 94–109)
CHOLEST SERPL-MCNC: 227 MG/DL
CO2 SERPL-SCNC: 26 MMOL/L (ref 20–32)
CREAT SERPL-MCNC: 0.64 MG/DL (ref 0.52–1.04)
FASTING STATUS PATIENT QL REPORTED: YES
GFR SERPL CREATININE-BSD FRML MDRD: >90 ML/MIN/1.73M2
GLUCOSE BLD-MCNC: 94 MG/DL (ref 70–99)
HBA1C MFR BLD: 5.3 % (ref 0–5.6)
HDLC SERPL-MCNC: 80 MG/DL
LDLC SERPL CALC-MCNC: 135 MG/DL
NONHDLC SERPL-MCNC: 147 MG/DL
POTASSIUM BLD-SCNC: 3.8 MMOL/L (ref 3.4–5.3)
SODIUM SERPL-SCNC: 137 MMOL/L (ref 133–144)
TRIGL SERPL-MCNC: 58 MG/DL
VIT B12 SERPL-MCNC: 514 PG/ML (ref 232–1245)

## 2022-12-15 PROCEDURE — 83036 HEMOGLOBIN GLYCOSYLATED A1C: CPT

## 2022-12-15 PROCEDURE — 36415 COLL VENOUS BLD VENIPUNCTURE: CPT

## 2022-12-15 PROCEDURE — 80061 LIPID PANEL: CPT

## 2022-12-15 PROCEDURE — 82607 VITAMIN B-12: CPT

## 2022-12-15 PROCEDURE — 80048 BASIC METABOLIC PNL TOTAL CA: CPT

## 2022-12-15 NOTE — LETTER
December 26, 2022      Ruth Nan Resendez  [address]  [facility] [address]        Ruth,  It was a pleasure to see you in the office recently.   -Vitamin B12 level looks better.  Please continue your current supplement.  - the kidney and electrolyte panel was normal.  Your glucose and A1c diabetes test were normal range this year which is great.  Keep up the good work.  -Cholesterol panel is about the same as last year for the LDL (bad cholesterol) but your good cholesterol has improved further.  Continue to work on lifestyle measures to keep your cardiovascular risks low.  Please MyChart or call if you have any concerns or questions.   Sincerely,  Renee Mendez MD    Resulted Orders   Vitamin B12   Result Value Ref Range    Vitamin B12 514 232 - 1,245 pg/mL

## 2022-12-16 NOTE — RESULT ENCOUNTER NOTE
Ruth,  It was a pleasure to see you in the office recently.   -Vitamin B12 level looks better.  Please continue your current supplement.  - the kidney and electrolyte panel was normal.  Your glucose and A1c diabetes test were normal range this year which is great.  Keep up the good work.  -Cholesterol panel is about the same as last year for the LDL (bad cholesterol) but your good cholesterol has improved further.  Continue to work on lifestyle measures to keep your cardiovascular risks low.  Please MyChart or call if you have any concerns or questions.   Sincerely,  Renee Mendez MD

## 2022-12-28 ENCOUNTER — TELEPHONE (OUTPATIENT)
Dept: GASTROENTEROLOGY | Facility: CLINIC | Age: 57
End: 2022-12-28

## 2022-12-28 NOTE — TELEPHONE ENCOUNTER
Screening Questions    BlueKIND OF PREP RedLOCATION [review exclusion criteria] GreenSEDATION TYPE    N Have you had a positive covid test in the last 2 weeks?   If yes, what date? Schedule out 14 days from symptoms  Y Your able to give consent for your medical care?  Y Are you active on mychart?   HP What insurance do you carry?    ALEC GALLEGOS Ordering/Referring Provider:     26.6 BMI [BMI OVER 40-EXTENDED PREP]  BMI OVER 40 NEED PAC EVALUATION FOR [facility]    Respiratory Screening  [If yes to any of the following HOSPITAL setting only]     N      Do you use daily home oxygen?   N      Do you have mod to severe Obstructive Sleep Apnea?  N      Do you have Pulmonary Hypertension? NEED PAC APPT AT [facility]    N      Do you have UNCONTROLLED asthma?      N Have you had a heart or lung transplant?       N Are you currently on dialysis? [If yes, G-PREP & HOSPITAL setting only]   N  Do you have chronic kidney disease? [If yes, G-PREP]  N  Do you have a diagnosis of diabetes?[If yes, G-PREP]    N Have you had a stroke or Transient ischemic attack (TIA - aka  mini stroke ) within 6 months? (If yes, please review exclusion criteria)  N  In the past 6 months, have you had any heart related issues including cardiomyopathy or heart attack?   N  If yes, did it require cardiac stenting or other implantable device?       N Do you have any implantable devices in your body (pacemaker, defib, LVAD)? (If yes, please review exclusion criteria)    N Do you take nitroglycerin?   N If yes, how often?  (If yes, HOSPITAL setting ONLY)  N Are you currently taking any blood thinners?           [IF YES, INFORM PATIENT TO FOLLOW UP W/ ORDERING PROVIDER FOR BRIDGING INSTRUCTIONS]     N  Do you take Phentermine?   Yes-> Hold for 7 days before procedure.  Please consult your prescribing provider if you have questions about holding this  medication.         [FEMALES] are you currently pregnant?       If yes, how many weeks? [Greater than 12 weeks, OR NEEDED]    N Any prescription pain medications taken daily like narcotics? [EXTENDED PREP AND MAC]    N  Any chemical dependencies such as alcohol, street drugs, or methadone? [If yes, MAC]    N Any post-traumatic stress syndrome, severe anxiety or history of psychosis? [If yes, MAC]    N Do you need help transferring? (If NO, please HOSPITAL setting  only)     N  On a regular basis do you go 3-5 days without a bowel movement?[ If yes, EXTENDED PREP.]     Preferred LOCAL Pharmacy for Pre Prescription:           [id number] [place] [address] [facility]                        Scheduling Details    Ruth Caller:   (Please ask for phone number if not scheduled by patient)    Type of Procedure Scheduled: Lower Endoscopy [Colonoscopy]      STANDARD [facility]-If you answer yes to questions #8, #20, #21 Which Colonoscopy Prep was Sent:   BRANDY CF PATIENTS & GROEN'S PATIENTS NEEDS EXTENDED PREP    3/10 Date of Procedure:   JEREMY Surgeon:    Location:      Sedation Type:     Conscious Sedation- Needs  for 6 hours after the procedure  MAC/General-Needs  for 24 hours after procedure    Y Informed patient they will need an adult          Cannot take any type of public or medical transportation alone    Y Confirmed Nurse will call to complete assessment      Pre-Procedure Covid test to be completed [[facility] PCR Testing Required]    DOUBLE CHECK BMI AND ANN  NEEDS ADULT -CANNOT TAKE PUBILC OR MEDICAL TRANSPOTATION  COVID TEST FOR [facility] 3-4 DAYS  COIVD TEST FOR [facility] CAN BE HOME     Additional comments:

## 2022-12-29 ENCOUNTER — ANCILLARY PROCEDURE (OUTPATIENT)
Dept: BONE DENSITY | Facility: CLINIC | Age: 57
End: 2022-12-29
Attending: FAMILY MEDICINE
Payer: COMMERCIAL

## 2022-12-29 DIAGNOSIS — E28.39 ESTROGEN DEFICIENCY: ICD-10-CM

## 2022-12-29 PROBLEM — M85.80 OSTEOPENIA, UNSPECIFIED LOCATION: Status: ACTIVE | Noted: 2022-12-29

## 2022-12-29 PROCEDURE — 77080 DXA BONE DENSITY AXIAL: CPT | Performed by: RADIOLOGY

## 2022-12-29 NOTE — RESULT ENCOUNTER NOTE
Danial Miranda,  I've reviewed your recent dexa scan results.     Osteopenia is diagnosed when there is mild to moderate thinning of the bone (T score -1 to -2.4). Osteoporosis is diagnosed when there is moderate to severe thinning (T score of < -2.5). Low bone density may put you at risk for a fracture.    You have osteopenia of your hip.  You have normal density of your spine (but borderline for osteopenia).       To help prevent further loss of bone, the following is recommended:    Take in adequate amounts of Calcium and Vitamin D. These are the building blocks for bones. Most women will need 1,200 mg of Calcium (through diet and supplements if needed) and 1,000 international units of Vitamin D daily (typically through supplements).    Exercise daily to keep your bones strong. Thirty minutes of moderate walking or other aerobic activity is recommended.    Given your overall fracture risk is still low, no further medication/treatment is recommended at this point. We will want to monitor the bone density again in two years. If you have any questions or concerns, please mychart or call.     Kind regards,  Renee Mendez MD

## 2023-01-19 ENCOUNTER — TELEPHONE (OUTPATIENT)
Dept: GASTROENTEROLOGY | Facility: CLINIC | Age: 58
End: 2023-01-19
Payer: COMMERCIAL

## 2023-01-19 NOTE — TELEPHONE ENCOUNTER
Caller: Dell Miranda  Reason for Reschedule/Cancellation (please be detailed, any staff messages or encounters to note?): Provider Out      Prior to reschedule please review:    Ordering Provider:Renee Mendez MD     Sedation per order:Moderate    Does patient have any ASC Exclusions, please identify?: N      Notes on Cancelled Procedure:    Procedure:Lower Endoscopy [Colonoscopy]     Date: 03/10/23    Location:[facility]; [address], [place] [address]    Surgeon: Leah        Rescheduled: Yes    Procedure: Lower Endoscopy [Colonoscopy]     Date: 3/24    Location:[facility]; [address], [place] [id number]    Surgeon: Leah    Sedation Level Scheduled  Moderate,  Reason for Sedation Level Order    Prep/Instructions updated and sent: Yes

## 2023-03-01 ENCOUNTER — VIRTUAL VISIT (OUTPATIENT)
Dept: FAMILY MEDICINE | Facility: CLINIC | Age: 58
End: 2023-03-01
Payer: OTHER MISCELLANEOUS

## 2023-03-01 DIAGNOSIS — G44.319 ACUTE POST-TRAUMATIC HEADACHE, NOT INTRACTABLE: ICD-10-CM

## 2023-03-01 DIAGNOSIS — S09.90XA CLOSED HEAD INJURY, INITIAL ENCOUNTER: Primary | ICD-10-CM

## 2023-03-01 PROCEDURE — 99214 OFFICE O/P EST MOD 30 MIN: CPT | Mod: VID | Performed by: FAMILY MEDICINE

## 2023-03-01 ASSESSMENT — ENCOUNTER SYMPTOMS: HEADACHES: 1

## 2023-03-01 NOTE — PROGRESS NOTES
Ruth is a 57 year old who is being evaluated via a billable video visit.      How would you like to obtain your AVS? MyChart  If the video visit is dropped, the invitation should be resent by: Text to cell phone: [phone]  Will anyone else be joining your video visit? No          Assessment & Plan     Closed head injury, initial encounter  Acute post-traumatic headache, not intractable  Likely post-concussive headaches. No obvious neuro deficits but concerning that headaches worsening/persistent. Need to r/o SDH given mechanism of fall and worsening sx's. rec scan within 24 hours. Go to er if worsening or new sx's develop reviewed.   Discussed brain rest and rec next few days off work, low stimulus environment, avoid screens   - CT Head w/o Contrast; Future           Return in about 1 week (around 3/8/2023), or if symptoms worsen or fail to improve.    Renee Mendez MD  [facility]   Ruth is a 57 year old, presenting for the following health issues:  Headache      Headache     History of Present Illness       Reason for visit:  I fell and hit the back of my head and have been having headaches  Symptom onset:  1-2 weeks ago  Symptoms include:  Headaches  Symptom intensity:  Moderate  Symptom progression:  Staying the same  Had these symptoms before:  No  What makes it worse:  No  What makes it better:  No    She eats 4 or more servings of fruits and vegetables daily.She consumes 0 sweetened beverage(s) daily.She exercises with enough effort to increase her heart rate 30 to 60 minutes per day.  She exercises with enough effort to increase her heart rate 4 days per week.   She is taking medications regularly.       Moving at her work place. Was pushing a rolling cart but shoe got caught in the roller and fell back and hit back of  her head on the concrete floor. Hit was hard and very loud. Other employees came running to see what happened     Got up right away and  felt she was okay.     However, now Has been having headaches that seemed to start a day or two later.    These are different from usual headaches. Headache is in the back of the head.   Back of head feels weird- skin feels more sensitive. No fluctuance.   Headaches seem to be worsening especially yesterday and the day prior, present every day.   Slightly sensitive to the light. Not helped by sleep.   Headache is low grade pain (2-3/10)  but other times can be more intense(last few days) where she needs to lay down with the headache (pain 7/10)  Focus, cognitive skills are fine.   No dizziness  No vision or hearing changes.   No weakness or numbness, tingling in arms or legs  No n/v  No mood changes.   Sleep is baseline.   Tired a lot but working a lot and busier than usual. Doesn't think new since fall though.   No neck pain.         Review of Systems   Neurological: Positive for headaches.            Objective           Vitals:  No vitals were obtained today due to virtual visit.    Physical Exam   GENERAL: Healthy, alert and no distress  EYES: Eyes grossly normal to inspection.  No discharge or erythema, or obvious scleral/conjunctival abnormalities.  RESP: No audible wheeze, cough, or visible cyanosis.  No visible retractions or increased work of breathing.    SKIN: Visible skin clear. No significant rash, abnormal pigmentation or lesions.  NEURO: Cranial nerves grossly intact.  Mentation and speech appropriate for age.  PSYCH: Mentation appears normal, affect normal/bright, judgement and insight intact, normal speech and appearance well-groomed.                Video-Visit Details    Type of service:  Video Visit     Originating Location (pt. Location): Home    Distant Location (provider location):  On-site  Platform used for Video Visit: [employer]

## 2023-03-02 ENCOUNTER — ANCILLARY PROCEDURE (OUTPATIENT)
Dept: CT IMAGING | Facility: CLINIC | Age: 58
End: 2023-03-02
Attending: FAMILY MEDICINE
Payer: OTHER MISCELLANEOUS

## 2023-03-02 DIAGNOSIS — S09.90XA CLOSED HEAD INJURY, INITIAL ENCOUNTER: ICD-10-CM

## 2023-03-02 DIAGNOSIS — G44.319 ACUTE POST-TRAUMATIC HEADACHE, NOT INTRACTABLE: ICD-10-CM

## 2023-03-02 PROCEDURE — 70450 CT HEAD/BRAIN W/O DYE: CPT | Mod: GC | Performed by: RADIOLOGY

## 2023-03-02 NOTE — RESULT ENCOUNTER NOTE
Ruth,  I am very happy to report your head CT was normal/negative for concerning bleeding or bruising of the brain.  Please continue with the plan to lay low and follow general concussion guidelines(I had placed some information about this in your after visit summary) and follow-up if you are having persistent issues over time.  Please MyChart or call if you have any concerns or questions.   Sincerely,  Renee Mendez MD

## 2023-03-09 ENCOUNTER — TELEPHONE (OUTPATIENT)
Dept: GASTROENTEROLOGY | Facility: CLINIC | Age: 58
End: 2023-03-09
Payer: COMMERCIAL

## 2023-03-09 DIAGNOSIS — Z12.11 ENCOUNTER FOR SCREENING COLONOSCOPY: Primary | ICD-10-CM

## 2023-03-09 RX ORDER — BISACODYL 5 MG/1
TABLET, DELAYED RELEASE ORAL
Qty: 4 TABLET | Refills: 0 | Status: SHIPPED | OUTPATIENT
Start: 2023-03-09 | End: 2023-03-09

## 2023-03-09 RX ORDER — SODIUM, POTASSIUM,MAG SULFATES 17.5-3.13G
SOLUTION, RECONSTITUTED, ORAL ORAL
Qty: 354 ML | Refills: 0 | Status: SHIPPED | OUTPATIENT
Start: 2023-03-09 | End: 2024-02-06

## 2023-03-09 NOTE — TELEPHONE ENCOUNTER
Pre assessment questions completed for upcoming Colonoscopy  procedure scheduled on 3.24.2023    Reviewed procedure suprep instructions.     Your colonoscopy prep prescription has been sent to    [facility] [address] [place] [address] [address]    Patient verbalized understanding and had no questions or concerns at this time.    Holly Bellamy RN  Endoscopy Procedure Pre Assessment RN

## 2023-03-09 NOTE — TELEPHONE ENCOUNTER
Pre assessment questions started for upcoming Colonoscopy  procedure scheduled on 3.24.2023    COVID policy reviewed.     Pre-op exam? N/A    Reviewed procedural arrival time 1030, procedure time 1115 and facility location [facility]; [address], [place] [address]    Designated  policy reviewed. Instructed to have someone stay 6 hours post procedure.     Anticoagulation/blood thinners? No    Electronic implanted devices? No    Diabetic? No    Procedure indication: screening colonoscopy    Bowel prep recommendation: Standard Golytely .  Patient would prefer suprep as per patient with their last colonoscopy they took a low volume prep that was about $100 OOP.  Staff message to scoping provider.     Holly Bellamy RN  Endoscopy Procedure Pre Assessment RN

## 2023-03-24 ENCOUNTER — HOSPITAL ENCOUNTER (OUTPATIENT)
Facility: AMBULATORY SURGERY CENTER | Age: 58
Discharge: HOME | End: 2023-03-24
Attending: SURGERY | Admitting: SURGERY
Payer: COMMERCIAL

## 2023-03-24 VITALS
HEART RATE: 82 BPM | SYSTOLIC BLOOD PRESSURE: 124 MMHG | TEMPERATURE: 98 F | OXYGEN SATURATION: 98 % | DIASTOLIC BLOOD PRESSURE: 61 MMHG | RESPIRATION RATE: 16 BRPM

## 2023-03-24 DIAGNOSIS — Z12.11 SCREENING FOR COLON CANCER: Primary | ICD-10-CM

## 2023-03-24 LAB — COLONOSCOPY: NORMAL

## 2023-03-24 PROCEDURE — G8907 PT DOC NO EVENTS ON DISCHARG: HCPCS

## 2023-03-24 PROCEDURE — 45385 COLONOSCOPY W/LESION REMOVAL: CPT

## 2023-03-24 PROCEDURE — 99152 MOD SED SAME PHYS/QHP 5/>YRS: CPT | Mod: 59 | Performed by: SURGERY

## 2023-03-24 PROCEDURE — G8918 PT W/O PREOP ORDER IV AB PRO: HCPCS

## 2023-03-24 PROCEDURE — 88305 TISSUE EXAM BY PATHOLOGIST: CPT | Performed by: PATHOLOGY

## 2023-03-24 PROCEDURE — 45385 COLONOSCOPY W/LESION REMOVAL: CPT | Mod: PT | Performed by: SURGERY

## 2023-03-24 RX ORDER — FENTANYL CITRATE 50 UG/ML
INJECTION, SOLUTION INTRAMUSCULAR; INTRAVENOUS PRN
Status: DISCONTINUED | OUTPATIENT
Start: 2023-03-24 | End: 2023-03-24 | Stop reason: HOSPADM

## 2023-03-24 RX ORDER — PROCHLORPERAZINE MALEATE 10 MG
10 TABLET ORAL EVERY 6 HOURS PRN
Status: DISCONTINUED | OUTPATIENT
Start: 2023-03-24 | End: 2023-03-25 | Stop reason: HOSPADM

## 2023-03-24 RX ORDER — ONDANSETRON 4 MG/1
4 TABLET, ORALLY DISINTEGRATING ORAL EVERY 6 HOURS PRN
Status: DISCONTINUED | OUTPATIENT
Start: 2023-03-24 | End: 2023-03-25 | Stop reason: HOSPADM

## 2023-03-24 RX ORDER — FLUMAZENIL 0.1 MG/ML
0.2 INJECTION, SOLUTION INTRAVENOUS
Status: ACTIVE | OUTPATIENT
Start: 2023-03-24 | End: 2023-03-24

## 2023-03-24 RX ORDER — NALOXONE HYDROCHLORIDE 0.4 MG/ML
0.2 INJECTION, SOLUTION INTRAMUSCULAR; INTRAVENOUS; SUBCUTANEOUS
Status: DISCONTINUED | OUTPATIENT
Start: 2023-03-24 | End: 2023-03-25 | Stop reason: HOSPADM

## 2023-03-24 RX ORDER — LIDOCAINE 40 MG/G
CREAM TOPICAL
Status: DISCONTINUED | OUTPATIENT
Start: 2023-03-24 | End: 2023-03-25 | Stop reason: HOSPADM

## 2023-03-24 RX ORDER — ONDANSETRON 2 MG/ML
4 INJECTION INTRAMUSCULAR; INTRAVENOUS EVERY 6 HOURS PRN
Status: DISCONTINUED | OUTPATIENT
Start: 2023-03-24 | End: 2023-03-25 | Stop reason: HOSPADM

## 2023-03-24 RX ORDER — NALOXONE HYDROCHLORIDE 0.4 MG/ML
0.4 INJECTION, SOLUTION INTRAMUSCULAR; INTRAVENOUS; SUBCUTANEOUS
Status: DISCONTINUED | OUTPATIENT
Start: 2023-03-24 | End: 2023-03-25 | Stop reason: HOSPADM

## 2023-03-24 RX ORDER — ONDANSETRON 2 MG/ML
4 INJECTION INTRAMUSCULAR; INTRAVENOUS
Status: DISCONTINUED | OUTPATIENT
Start: 2023-03-24 | End: 2023-03-25 | Stop reason: HOSPADM

## 2023-03-24 NOTE — H&P
Pre-Endoscopy History and Physical     Ruth Resendez MRN# [id number]   YOB: 1965 Age: 57 year old     Date of Procedure: 3/24/2023  Primary care provider: Renee Mendez  Type of Endoscopy: colonoscopy  Reason for Procedure: history of adenoma  Type of Anesthesia Anticipated: Moderate Sedation    HPI:    Ruth is a 57 year old female who will be undergoing the above procedure.      A history and physical has been performed. The patient's medications and allergies have been reviewed. The risks and benefits of the procedure and the sedation options and risks were discussed with the patient.  All questions were answered and informed consent was obtained.      She denies a personal or family history of anesthesia complications or bleeding disorders.     No Known Allergies     Cannot display prior to admission medications because the patient has not been admitted in this contact.     Current Outpatient Medications   Medication     lisinopril (ZESTRIL) 10 MG tablet     Na Sulfate-K Sulfate-Mg Sulf (SUPREP BOWEL PREP KIT) solution     Na Sulfate-K Sulfate-Mg Sulf (SUPREP BOWEL PREP KIT) solution     Current Facility-Administered Medications   Medication     lidocaine (LMX4) kit     lidocaine 1 % 0.1-1 mL     ondansetron (ZOFRAN) injection 4 mg     sodium chloride (PF) 0.9% PF flush 3 mL     sodium chloride (PF) 0.9% PF flush 3 mL         Patient Active Problem List   Diagnosis     Hypertension goal BP (blood pressure) < 140/90     CARDIOVASCULAR SCREENING; LDL GOAL LESS THAN 160     GERD (gastroesophageal reflux disease)     Rhinitis     Microscopic hematuria     Hives     Osteopenia, unspecified location        Past Medical History:   Diagnosis Date     C. difficile diarrhea 10/2009    tx with oral flagyl  more thanonce then oral vanco-Resistent to flagyl. Took Vanco for 6 months.     Essential hypertension      Mastitis      Normal vaginal delivery   x 4    last delivery 7/5/09     Oral  "contraceptive use      Pregnancy induced hypertension         Past Surgical History:   Procedure Laterality Date     AS ENLARGE BREAST WITH IMPLANT       COLONOSCOPY WITH CO2 INSUFFLATION N/A 3/26/2018    Procedure: COLONOSCOPY WITH CO2 INSUFFLATION;  Screen for colon cancer  BMI- 27.91  referred by: Renee Mendez MD  [facility] Fax# [phone]  Colonoscopy;  Surgeon: Michi Fernandez MD;  Location: [facility] OR     SURGICAL HISTORY OF -       eye surgery as a child following a sledding injury       Social History     Tobacco Use     Smoking status: Never     Smokeless tobacco: Never   Substance Use Topics     Alcohol use: Yes     Alcohol/week: 0.0 standard drinks     Comment: 4 glasses wine per week.        Family History   Problem Relation Age of Onset     Respiratory Father         lung disease, former smoker, fatal age histiocystosis, lawn chem,  age 62     Cancer Paternal Grandmother         throat cancer     Breast Cancer Other      Cancer - colorectal Other         uncle (I didn't ask whether maternal or paternal), fatal in 60s     Neurologic Disorder Mother         parkinsons onset at 66     Asthma No family hx of      C.A.D. No family hx of      Diabetes No family hx of      Hypertension No family hx of      Cerebrovascular Disease No family hx of      Prostate Cancer No family hx of        REVIEW OF SYSTEMS:     5 point ROS negative except as noted above in HPI, including Gen., Resp., CV, GI &  system review.      PHYSICAL EXAM:   /69   Temp 98  F (36.7  C) (Temporal)   Resp 20   LMP 2020   SpO2 98%  Estimated body mass index is 28.7 kg/m  as calculated from the following:    Height as of 21: 1.6 m (5' 3\").    Weight as of 21: 73.5 kg (162 lb).   GENERAL APPEARANCE: healthy, alert and no distress  MENTAL STATUS: alert  AIRWAY EXAM: Mallampatti Class III (visualization of the soft palate and base of uvula)  RESP: lungs clear to auscultation - no rales, rhonchi " or wheezes  CV: regular rates and rhythm      DIAGNOSTICS:    Not indicated      IMPRESSION   ASA Class 2 - Mild systemic disease        PLAN:       Plan for colonoscopy. We discussed the risks, benefits and alternatives and the patient wished to proceed.    The above has been forwarded to the consulting provider.      Signed Electronically by: Ata Lynn MD  March 24, 2023

## 2023-03-26 ENCOUNTER — HEALTH MAINTENANCE LETTER (OUTPATIENT)
Age: 58
End: 2023-03-26

## 2023-03-28 LAB
PATH REPORT.COMMENTS IMP SPEC: NORMAL
PATH REPORT.COMMENTS IMP SPEC: NORMAL
PATH REPORT.FINAL DX SPEC: NORMAL
PATH REPORT.GROSS SPEC: NORMAL
PATH REPORT.MICROSCOPIC SPEC OTHER STN: NORMAL
PATH REPORT.RELEVANT HX SPEC: NORMAL
PHOTO IMAGE: NORMAL

## 2023-10-23 DIAGNOSIS — I10 BENIGN ESSENTIAL HYPERTENSION: ICD-10-CM

## 2023-10-23 RX ORDER — LISINOPRIL 10 MG/1
10 TABLET ORAL DAILY
Qty: 90 TABLET | Refills: 0 | Status: SHIPPED | OUTPATIENT
Start: 2023-10-23 | End: 2024-01-25

## 2024-01-07 ENCOUNTER — HEALTH MAINTENANCE LETTER (OUTPATIENT)
Age: 59
End: 2024-01-07

## 2024-01-29 ENCOUNTER — LAB (OUTPATIENT)
Dept: LAB | Facility: CLINIC | Age: 59
End: 2024-01-29
Payer: COMMERCIAL

## 2024-01-29 ENCOUNTER — MYC MEDICAL ADVICE (OUTPATIENT)
Dept: FAMILY MEDICINE | Facility: CLINIC | Age: 59
End: 2024-01-29

## 2024-01-29 DIAGNOSIS — Z13.220 SCREENING CHOLESTEROL LEVEL: ICD-10-CM

## 2024-01-29 DIAGNOSIS — E53.8 LOW SERUM VITAMIN B12: ICD-10-CM

## 2024-01-29 DIAGNOSIS — R73.01 ELEVATED FASTING GLUCOSE: ICD-10-CM

## 2024-01-29 DIAGNOSIS — I10 BENIGN ESSENTIAL HYPERTENSION: ICD-10-CM

## 2024-01-29 DIAGNOSIS — I10 BENIGN ESSENTIAL HYPERTENSION: Primary | ICD-10-CM

## 2024-01-29 LAB
HBA1C MFR BLD: 5.4 % (ref 0–5.6)
HOLD SPECIMEN: NORMAL

## 2024-01-29 PROCEDURE — 80048 BASIC METABOLIC PNL TOTAL CA: CPT

## 2024-01-29 PROCEDURE — 36415 COLL VENOUS BLD VENIPUNCTURE: CPT

## 2024-01-29 PROCEDURE — 83036 HEMOGLOBIN GLYCOSYLATED A1C: CPT

## 2024-01-29 PROCEDURE — 82607 VITAMIN B-12: CPT

## 2024-01-29 PROCEDURE — 80061 LIPID PANEL: CPT

## 2024-01-29 NOTE — PROGRESS NOTES
Patient came to the Lab for blood draw on 01/29/2024 but there was no order.Please put in order if needed.     THANKS [place] Rosendo LYLE

## 2024-01-30 LAB
ANION GAP SERPL CALCULATED.3IONS-SCNC: 10 MMOL/L (ref 7–15)
BUN SERPL-MCNC: 10.3 MG/DL (ref 6–20)
CALCIUM SERPL-MCNC: 9.5 MG/DL (ref 8.6–10)
CHLORIDE SERPL-SCNC: 103 MMOL/L (ref 98–107)
CHOLEST SERPL-MCNC: 214 MG/DL
CREAT SERPL-MCNC: 0.78 MG/DL (ref 0.51–0.95)
DEPRECATED HCO3 PLAS-SCNC: 26 MMOL/L (ref 22–29)
EGFRCR SERPLBLD CKD-EPI 2021: 88 ML/MIN/1.73M2
GLUCOSE SERPL-MCNC: 109 MG/DL (ref 70–99)
HDLC SERPL-MCNC: 64 MG/DL
LDLC SERPL CALC-MCNC: 128 MG/DL
NONHDLC SERPL-MCNC: 150 MG/DL
POTASSIUM SERPL-SCNC: 4.2 MMOL/L (ref 3.4–5.3)
SODIUM SERPL-SCNC: 139 MMOL/L (ref 135–145)
TRIGL SERPL-MCNC: 110 MG/DL
VIT B12 SERPL-MCNC: 381 PG/ML (ref 232–1245)

## 2024-01-30 NOTE — RESULT ENCOUNTER NOTE
Ruth,  Thanks for coming in for the lab work.  Your fasting glucose is elevated in the prediabetic range but the A1c test which looks at average glucose over the last 3 months was quite normal.  The kidney function and electrolytes were all good/normal.  Vitamin B12 was lower range of normal.  Cholesterol panel looks about the same as last year.   We can review these results in more detail at your upcoming visit.  See you soon!  Sincerely,  Renee Mendez MD

## 2024-02-06 ENCOUNTER — VIRTUAL VISIT (OUTPATIENT)
Dept: FAMILY MEDICINE | Facility: CLINIC | Age: 59
End: 2024-02-06
Payer: COMMERCIAL

## 2024-02-06 DIAGNOSIS — I10 BENIGN ESSENTIAL HYPERTENSION: Primary | ICD-10-CM

## 2024-02-06 DIAGNOSIS — M25.551 HIP PAIN, RIGHT: ICD-10-CM

## 2024-02-06 DIAGNOSIS — E53.8 LOW SERUM VITAMIN B12: ICD-10-CM

## 2024-02-06 DIAGNOSIS — R73.01 ELEVATED FASTING GLUCOSE: ICD-10-CM

## 2024-02-06 PROCEDURE — 99214 OFFICE O/P EST MOD 30 MIN: CPT | Mod: 95 | Performed by: FAMILY MEDICINE

## 2024-02-06 NOTE — PATIENT INSTRUCTIONS
Vitamin B12 1,000 mcg  Vitamin D-3 1,000 international unit(s) or (25 mcg)  Calcium 1200 mg per day total (devided and take with food)    Schedule medical assistant only visit in clinic for vaccines: influenza and covid19  Schedule xray only appointment.   Physical therapy  will contact you. Please update me if hip pain not improving with the therapy. Would consider ortho referral.     Next visit in 6-12 months, in person

## 2024-02-06 NOTE — PROGRESS NOTES
"    Instructions Relayed to Patient by Virtual Roomer:     Patient is active on [facility]:   Relayed following to patient: \"It looks like you are active on [facility], are you able to join the visit this way? If not, do you need us to send you a link now or would you like your provider to send a link via text or email when they are ready to initiate the visit?\"    Reminded patient to ensure they were logged on to virtual visit by arrival time listed. Documented in appointment notes if patient had flexibility to initiate visit sooner than arrival time. If pediatric virtual visit, ensured pediatric patient along with parent/guardian will be present for video visit.     Patient offered the website [email or web address] and/or phone number to [facility] Help line: [phone]    Ruth is a 58 year old who is being evaluated via a billable video visit.      How would you like to obtain your AVS? [employer]  If the video visit is dropped, the invitation should be resent by: Text to cell phone: [phone]  Will anyone else be joining your video visit? No      Assessment & Plan     Benign essential hypertension  Well controlled on lisinopril. Continue to monitor bp at home  Mild hyperlipidemia but ascvd risk score still low. Continue lifestyle measures    Hip pain, right  ?OA/bursitis. Will have her complete xray and get some therapy started. Certainly refer to ortho if ongoing sx's  - XR Hip Right 2-3 Views; Future  - Physical Therapy Referral; Future    Low serum vitamin B12  Not supplementing. Rec pill box     Elevated fasting glucose  Excellent A1c. Has been working on lifestyle changes.               Patient Instructions   Vitamin B12 1,000 mcg  Vitamin D-3 1,000 international unit(s) or (25 mcg)  Calcium 1200 mg per day total (devided and take with food)    Schedule medical assistant only visit in clinic for vaccines: influenza and covid19  Schedule xray only appointment.   Physical therapy  will " contact you. Please update me if hip pain not improving with the therapy. Would consider ortho referral.     Clarissa Miranda is a 58 year old, presenting for the following health issues:  Recheck Medication and Hypertension (Lisinopril)        2/6/2024     7:18 AM   Additional Questions   Roomed by Jose BENDER   Accompanied by Self     History of Present Illness       Hypertension: She presents for follow up of hypertension.  She does check blood pressure  regularly outside of the clinic. Outpatient blood pressures have not been over 140/90. She follows a low salt diet.       Retired now.   Exercise has gone up more. Walking, pilates (5-6 days a week)  Eating better/cooking right.     Not supplementing vit B12 as forget easily    Not milk drinker. Eats yogart.     Right hip pain present. Feels deep inside the hip. If still for a while and then move will feel stiff/pain. Occ will notice when lay sideways on reformer in pilates.   No parasthesias/weakness legs  Sometimes pain on the outside of the hip also and tender to t    Home bp's normal.     Planning to schedule mammogram with ob group    Got covid last fall. Didn't get booster then due to that          Objective           Vitals:  No vitals were obtained today due to virtual visit.    Physical Exam   GENERAL: alert and no distress  EYES: Eyes grossly normal to inspection.  No discharge or erythema, or obvious scleral/conjunctival abnormalities.  RESP: No audible wheeze, cough, or visible cyanosis.    SKIN: Visible skin clear. No significant rash, abnormal pigmentation or lesions.  NEURO: Cranial nerves grossly intact.  Mentation and speech appropriate for age.  PSYCH: Appropriate affect, tone, and pace of words      Component      Latest Ref Rng 1/29/2024  8:56 AM   Sodium      135 - 145 mmol/L 139    Potassium      3.4 - 5.3 mmol/L 4.2    Chloride      98 - 107 mmol/L 103    Carbon Dioxide (CO2)      22 - 29 mmol/L 26    Anion Gap      7 - 15 mmol/L 10    Urea  Nitrogen      6.0 - 20.0 mg/dL 10.3    Creatinine      0.51 - 0.95 mg/dL 0.78    GFR Estimate      >60 mL/min/1.73m2 88    Calcium      8.6 - 10.0 mg/dL 9.5    Glucose      70 - 99 mg/dL 109 (H)    Cholesterol      <200 mg/dL 214 (H)    Triglycerides      <150 mg/dL 110    HDL Cholesterol      >=50 mg/dL 64    LDL Cholesterol Calculated      <=100 mg/dL 128 (H)    Non HDL Cholesterol      <130 mg/dL 150 (H)    Hemoglobin A1C      0.0 - 5.6 % 5.4    Vitamin B12      232 - 1,245 pg/mL 381       Legend:  (H) High      Video-Visit Details    The 10-year ASCVD risk score (Hever DK, et al., 2019) is: 2.9%    Values used to calculate the score:      Age: 58 years      Sex: Female      Is Non- : No      Diabetic: No      Tobacco smoker: No      Systolic Blood Pressure: 124 mmHg      Is BP treated: Yes      HDL Cholesterol: 64 mg/dL      Total Cholesterol: 214 mg/dL      Type of service:  Video Visit     Originating Location (pt. Location): Home    Distant Location (provider location):  Off-site  Platform used for Video Visit: [employer]  Signed Electronically by: Renee Mendez MD

## 2024-02-27 DIAGNOSIS — I10 BENIGN ESSENTIAL HYPERTENSION: ICD-10-CM

## 2024-02-27 RX ORDER — LISINOPRIL 10 MG/1
10 TABLET ORAL DAILY
Qty: 90 TABLET | Refills: 0 | Status: SHIPPED | OUTPATIENT
Start: 2024-02-27 | End: 2024-06-06

## 2024-03-17 ENCOUNTER — HEALTH MAINTENANCE LETTER (OUTPATIENT)
Age: 59
End: 2024-03-17

## 2024-05-26 ENCOUNTER — HEALTH MAINTENANCE LETTER (OUTPATIENT)
Age: 59
End: 2024-05-26

## 2024-06-06 DIAGNOSIS — I10 BENIGN ESSENTIAL HYPERTENSION: ICD-10-CM

## 2024-06-06 RX ORDER — LISINOPRIL 10 MG/1
10 TABLET ORAL DAILY
Qty: 30 TABLET | Refills: 2 | Status: SHIPPED | OUTPATIENT
Start: 2024-06-06 | End: 2024-09-04

## 2024-09-04 DIAGNOSIS — I10 BENIGN ESSENTIAL HYPERTENSION: ICD-10-CM

## 2024-09-04 RX ORDER — LISINOPRIL 10 MG/1
10 TABLET ORAL DAILY
Qty: 30 TABLET | Refills: 0 | Status: SHIPPED | OUTPATIENT
Start: 2024-09-04 | End: 2024-09-27

## 2024-09-27 DIAGNOSIS — I10 BENIGN ESSENTIAL HYPERTENSION: ICD-10-CM

## 2024-09-27 RX ORDER — LISINOPRIL 10 MG/1
10 TABLET ORAL DAILY
Qty: 90 TABLET | Refills: 1 | Status: SHIPPED | OUTPATIENT
Start: 2024-09-27

## 2024-11-14 ENCOUNTER — TELEPHONE (OUTPATIENT)
Dept: FAMILY MEDICINE | Facility: CLINIC | Age: 59
End: 2024-11-14
Payer: COMMERCIAL

## 2024-11-14 DIAGNOSIS — I10 BENIGN ESSENTIAL HYPERTENSION: Primary | ICD-10-CM

## 2024-11-14 DIAGNOSIS — E53.8 LOW SERUM VITAMIN B12: ICD-10-CM

## 2024-11-14 DIAGNOSIS — R73.01 ELEVATED FASTING GLUCOSE: ICD-10-CM

## 2024-11-14 DIAGNOSIS — Z13.220 SCREENING CHOLESTEROL LEVEL: ICD-10-CM

## 2024-11-14 NOTE — TELEPHONE ENCOUNTER
General Call    Contacts       Contact Date/Time Type Contact Phone/Fax    11/14/2024 11:00 AM CST Phone (Incoming) Abelardo Resendezli Nan (Self) [phone] (M)          Reason for Call:  Pt scheduled an appt for med check on 12/26    What are your questions or concerns:   Pt need to know if she needs blood work prior to her appt    Date of last appointment with provider: 2/6/24    Could we send this information to you in [employer] or would you prefer to receive a phone call?:   Patient would like to be contacted via [employer]

## 2024-11-15 NOTE — TELEPHONE ENCOUNTER
Sure, labs prior to visit would be great.  Fasting if possible.  However not required for the visit so if timing does not work we can do labs at her appointment

## 2024-12-30 NOTE — PROGRESS NOTES
Ruth is a 59 year old  female who presents for annual breast/pelvic exam.     Besides routine health maintenance, she has no other health concerns today .    HPI:  The patient's PCP is  Renee Mendez MD.    Had recent annual exam with her PCP on 2024. Does not need any labs or refills. Has COVID/flu scheduled next week, is recovering from recent URI.   Main question is if any role for HRT. Has sleep issues due to hot flashes several times per week. No night sweats. Symptoms are bothersome. She has never been on HRT. Also experiences some cramping monthly that feels like she is due to have her cycle but has no bleeding. No postmenopausal bleeding. LMP in .   Is sexually active with , using partner vasectomy and menopause as contraception. Declines STI testing today. No vaginal discharge, vaginal dryness, or dyspareunia.   Pap smear is up to date. Denies any recent history of abnormal pap smears. Last was 2021: NILM. HR HPV negative > 5 year cotest recommended.   Mammogram done today. No breast concerns. No history of abnormal mammograms.   Has diagnosis of osteopenia managed by her PCP, not on medications. Repeating a DEXA at the 2 year nicole.    Patient denies any current or history of:   - migraines with aura  - liver disease  - kidney disease  - current smoking  - breast, ovarian, or endometrial cancer  - stroke, uncontrolled hypertension, or coronary artery disease   - seizure disorder  - personal or significant family history of venous thromboembolism    The patient has no contraindications to estrogen and may use OCPs.       GYNECOLOGIC HISTORY:    Patient's last menstrual period was 2020.    Regular menses? N/a  Menses every n/a days.  Length of menses: n/a days    Her current contraception method is: vasectomy and menopause.  She  reports that she has never smoked. She has never used smokeless tobacco.    Patient is sexually active.  STD testing offered?   Declined  Last PHQ-9 score on record =       2024     1:20 PM   PHQ-9 SCORE   PHQ-9 Total Score 0     Last GAD7 score on record =       2024     1:20 PM   DAV-7 SCORE   Total Score 0     Alcohol Score = 1    HEALTH MAINTENANCE:  Cholesterol: (  Cholesterol   Date Value Ref Range Status   2024 214 (H) <200 mg/dL Final   12/15/2022 227 (H) <200 mg/dL Final   2020 218 (H) <200 mg/dL Final     Comment:     Desirable:       <200 mg/dl   2019 225 (H) <200 mg/dL Final     Comment:     Desirable:       <200 mg/dl     Last Mammo. 2022, Result: Normal, Next Mammo: Today  Pap: (  Lab Results   Component Value Date    GYNINTERP  2021     Negative for Intraepithelial Lesion or Malignancy (NILM)    PAP NIL 2017      Colonoscopy:  3/24/2023, Result: Abnormal, Next Colonoscopy: 3/24/2030 years.  Dexa:  2022, osteopenia managed by PCP    Health maintenance updated:  yes    HISTORY:  OB History    Para Term  AB Living   6 4 0 0 2 4   SAB IAB Ectopic Multiple Live Births   2 0 0 0 0      # Outcome Date GA Lbr Diego/2nd Weight Sex Type Anes PTL Lv   6 SAB  11w0d          5 SAB            4 Para            3 Para            2 Para            1 Para                Patient Active Problem List   Diagnosis    Benign essential hypertension    CARDIOVASCULAR SCREENING; LDL GOAL LESS THAN 160    GERD (gastroesophageal reflux disease)    Rhinitis    Microscopic hematuria    Hives    Osteopenia, unspecified location    Elevated fasting glucose     Past Surgical History:   Procedure Laterality Date    AS ENLARGE BREAST WITH IMPLANT      COLONOSCOPY N/A 3/24/2023    Procedure: COLONOSCOPY, FLEXIBLE, WITH LESION REMOVAL USING SNARE;  Surgeon: Ata Lynn MD;  Location: [facility] OR    COLONOSCOPY WITH CO2 INSUFFLATION N/A 3/26/2018    Procedure: COLONOSCOPY WITH CO2 INSUFFLATION;  Screen for colon cancer  BMI- 27.91  referred by: Renee Mendez MD  [facility]  "[facility] Fax# [phone]  Colonoscopy;  Surgeon: Michi Fernandez MD;  Location: [facility] OR    COLONOSCOPY WITH CO2 INSUFFLATION N/A 3/24/2023    Procedure: COLONOSCOPY, WITH CO2 INSUFFLATION;  Surgeon: Ata Lynn MD;  Location: [facility] OR    SURGICAL HISTORY OF -       eye surgery as a child following a sledding injury      Social History     Tobacco Use    Smoking status: Never    Smokeless tobacco: Never   Substance Use Topics    Alcohol use: Yes     Comment: 0-2 drinks per month      Problem (# of Occurrences) Relation (Name,Age of Onset)    Cancer (1) Paternal Grandmother: throat cancer    Neurologic Disorder (1) Mother: parkinsons onset at 66    Respiratory (1) Father: lung disease, former smoker, fatal age histiocystosis, lawn chem,  age 62    Breast Cancer (1) Other    Cancer - colorectal (1) Other: uncle (I didn't ask whether maternal or paternal), fatal in 60s           Negative family history of: Asthma, C.A.D., Diabetes, Hypertension, Cerebrovascular Disease, Prostate Cancer              Current Outpatient Medications   Medication Sig Dispense Refill    Cyanocobalamin (VITAMIN B 12 PO) Take by mouth.      lisinopril (ZESTRIL) 10 MG tablet Take 1 tablet (10 mg) by mouth daily. 90 tablet 3     No current facility-administered medications for this visit.     No Known Allergies    Past medical, surgical, social and family histories were reviewed and updated in EPIC.    ROS:   12 point review of systems negative other than symptoms noted below or in the HPI.  No urinary frequency or dysuria, bladder or kidney problems    EXAM:  /80   Ht 1.6 m (5' 3\")   Wt 77.9 kg (171 lb 12.8 oz)   LMP 2020   BMI 30.43 kg/m     BMI: Body mass index is 30.43 kg/m .    PHYSICAL EXAM:  Constitutional:   Appearance: Well nourished, well developed, alert, in no acute distress  Neck:  Lymph Nodes:  No lymphadenopathy present    Thyroid:  Gland size normal, nontender, no nodules or masses present  on " palpation  Chest:  Respiratory Effort:  Breathing unlabored  Cardiovascular:    Heart: Auscultation:  Regular rate, normal rhythm, no murmurs present  Breasts: Declined, no breast concerns, mammogram before  Gastrointestinal:   Abdominal Examination:  Abdomen nontender to palpation, tone normal without rigidity or guarding, no masses present, umbilicus without lesions   Liver and Spleen:  No hepatomegaly present, liver nontender to palpation    Hernias:  No hernias present  Lymphatic: Lymph Nodes:  No other lymphadenopathy present  Skin:  General Inspection:  No rashes present, no lesions present, no areas of  discoloration  Neurologic:    Mental Status:  Oriented X3.  Normal strength and tone, sensory exam                grossly normal, mentation intact and speech normal.    Psychiatric:   Mentation appears normal and affect normal/bright.         Pelvic Exam:  External Genitalia:     Normal appearance for age, no discharge present, no tenderness present, no inflammatory lesions present, color normal  Vagina:     Normal vaginal vault without central or paravaginal defects, ATROPHIC  Bladder:     Nontender to palpation  Urethra:   Urethral Body:  Urethra palpation normal, urethra structural support normal   Urethral Meatus:  No erythema or lesions present  Cervix:     Appearance healthy, no lesions present, nontender to palpation, no bleeding present  Uterus:     Nontender to palpation, no masses present, position anteflexed, mobility: normal  Adnexa:     No adnexal tenderness present, no adnexal masses present    Ludy Pardo MA present as chaperone during sensitive exam.       COUNSELING:   Reviewed preventive health counseling, as reflected in patient instructions    BMI: Body mass index is 30.43 kg/m .  Weight management plan: Discussed healthy diet and exercise guidelines    ASSESSMENT:  59 year old female with satisfactory breast/pelvic exam.      ICD-10-CM    1. Encounter for gynecological examination  without abnormal finding  Z01.419       2. Vaginal atrophy  N95.2       3. Hot flashes  R23.2           PLAN:  - Overall normal pelvic exam, evidence of vaginal atrophy. Patient is asymptomatic. If symptomatic, recommend trial of vaginal estrogen cream.  Also reviewed non-hormonal options of vaginal moisturizers. As asymptomatic, will defer for now  - Mammogram completed prior to visit.   - RTC 1 year for annual exam  - Discussed HRT.   Discussed (sammie)menopause and typical course of vasomotor symptoms, metabolic changes and mood effects. Reviewed that this has typically started for many by age 45-47 but can be even sooner for some, though later for others. Vaginal dryness and sexual health function may decline further overtime where as typically, vasomotor symptoms will improve in most with time, though this is variable. Discussed interventions for specific vulvovaginal health vs systemic treatment for general vasomotor symptoms.     From a vasomotor standpoint, we discussed treatment options of expectant management versus HRT. Patient has no contraindications to estrogen therapy. We discussed the risks/benefits/alternatives associated with HRT. We discussed the 2002 WHI trial and the limitations of this study including some generalizations applied and the more recent extension data. Discussed pros/cons as well as short and long term safety data.  Reviewed the studied population in the WHI and the indication for start of HRT and the years out from menopause in that group vs how HRT would typically be used. Initially, the 2002 WHI trial reported that menopausal estrogen-progesterone therapy increased the risk of breast cancer, stroke, and coronary heart disease in women. From these clinical trial conclusions, it was thought that the medroxyprogesterone acetate component of combined HRT was what increased the risk of breast cancer for women. Expert opinion favors micronized progesterone, presenting a lower breast  cancer risk than the medroxyprogesterone acetate that was used in the WHI trials. Any potential increase in the risk of breast cancer is related to treatment duration and reduces after stopping HRT. Placing this risk in the context of lifestyle factors can be beneficial. Lifestyle changes, including quitting smoking, reducing alcohol consumption, losing weight, and maintaining regular exercise, can have a more substantial impact on overall health than medications alone.     We reviewed HRT is not approved for treatment of chronic conditions/other indications outside of vasomotor symptoms.Discussed dosing of HRT will be guided by symptoms rather than any lab testing. As patient still has a uterus, we also discussed the need for progesterone therapy to prevent an increased risk of endometrial cancer.     We discussed potential side effects of HRT including vaginal spotting/bleeding, breast soreness (typically temporary), headaches, and nausea. Reviewed uptitration of HRT dosing may be required pending on response. The goal is to use HRT for the lowest dose and the shortest amount of time. We also discussed the risk of VTE, stroke, and CVD with estrogen therapy. I recommend using the patch to avoid first pass metabolism and decrease these risks.     While HRT is the most effective option for management of vasomotor symptoms, we briefly discussed non-hormonal treatment options that have evidence for vasomotor symptoms [SSRIs (Paroxetine, Citalopram, Escitalopram), SNRIs (Venlafaxine, Desvenlafaxine), Gabapentin, and Fezolinetant].    After discussion, patient would like to think about HRT. If she desires to start to help manage her bothersome vasomotor symptoms, she will reach out via [employer].      Elise Scruggs MD    On 12/31/24, 10 minutes in addition to the breast/pelvic exam was spent on direct management of the patient's other medical issue(s): vasomotor symptoms including chart review and chart completion, on  the same DOS.

## 2024-12-31 ENCOUNTER — ANCILLARY PROCEDURE (OUTPATIENT)
Dept: MAMMOGRAPHY | Facility: CLINIC | Age: 59
End: 2024-12-31
Attending: OBSTETRICS & GYNECOLOGY
Payer: COMMERCIAL

## 2024-12-31 ENCOUNTER — OFFICE VISIT (OUTPATIENT)
Dept: OBGYN | Facility: CLINIC | Age: 59
End: 2024-12-31
Attending: OBSTETRICS & GYNECOLOGY
Payer: COMMERCIAL

## 2024-12-31 VITALS
WEIGHT: 171.8 LBS | DIASTOLIC BLOOD PRESSURE: 80 MMHG | SYSTOLIC BLOOD PRESSURE: 120 MMHG | BODY MASS INDEX: 30.44 KG/M2 | HEIGHT: 63 IN

## 2024-12-31 DIAGNOSIS — R23.2 HOT FLASHES: ICD-10-CM

## 2024-12-31 DIAGNOSIS — Z12.31 VISIT FOR SCREENING MAMMOGRAM: ICD-10-CM

## 2024-12-31 DIAGNOSIS — Z01.419 ENCOUNTER FOR GYNECOLOGICAL EXAMINATION WITHOUT ABNORMAL FINDING: Primary | ICD-10-CM

## 2024-12-31 DIAGNOSIS — N95.2 VAGINAL ATROPHY: ICD-10-CM

## 2024-12-31 PROCEDURE — 77067 SCR MAMMO BI INCL CAD: CPT | Mod: TC | Performed by: RADIOLOGY

## 2024-12-31 PROCEDURE — 77063 BREAST TOMOSYNTHESIS BI: CPT | Mod: TC | Performed by: RADIOLOGY

## 2024-12-31 ASSESSMENT — ANXIETY QUESTIONNAIRES
6. BECOMING EASILY ANNOYED OR IRRITABLE: NOT AT ALL
GAD7 TOTAL SCORE: 0
GAD7 TOTAL SCORE: 0
3. WORRYING TOO MUCH ABOUT DIFFERENT THINGS: NOT AT ALL
7. FEELING AFRAID AS IF SOMETHING AWFUL MIGHT HAPPEN: NOT AT ALL
1. FEELING NERVOUS, ANXIOUS, OR ON EDGE: NOT AT ALL
2. NOT BEING ABLE TO STOP OR CONTROL WORRYING: NOT AT ALL
5. BEING SO RESTLESS THAT IT IS HARD TO SIT STILL: NOT AT ALL

## 2024-12-31 ASSESSMENT — PATIENT HEALTH QUESTIONNAIRE - PHQ9
5. POOR APPETITE OR OVEREATING: NOT AT ALL
SUM OF ALL RESPONSES TO PHQ QUESTIONS 1-9: 0

## 2025-01-06 ENCOUNTER — ALLIED HEALTH/NURSE VISIT (OUTPATIENT)
Dept: FAMILY MEDICINE | Facility: CLINIC | Age: 60
End: 2025-01-06
Payer: COMMERCIAL

## 2025-01-06 ENCOUNTER — LAB (OUTPATIENT)
Dept: LAB | Facility: CLINIC | Age: 60
End: 2025-01-06
Payer: COMMERCIAL

## 2025-01-06 DIAGNOSIS — E53.8 LOW SERUM VITAMIN B12: ICD-10-CM

## 2025-01-06 DIAGNOSIS — I10 BENIGN ESSENTIAL HYPERTENSION: ICD-10-CM

## 2025-01-06 DIAGNOSIS — Z23 ENCOUNTER FOR IMMUNIZATION: Primary | ICD-10-CM

## 2025-01-06 DIAGNOSIS — R73.01 ELEVATED FASTING GLUCOSE: ICD-10-CM

## 2025-01-06 DIAGNOSIS — M85.80 OSTEOPENIA, UNSPECIFIED LOCATION: ICD-10-CM

## 2025-01-06 DIAGNOSIS — Z13.220 SCREENING CHOLESTEROL LEVEL: ICD-10-CM

## 2025-01-06 DIAGNOSIS — R31.29 MICROSCOPIC HEMATURIA: ICD-10-CM

## 2025-01-06 LAB
ALBUMIN UR-MCNC: NEGATIVE MG/DL
ANION GAP SERPL CALCULATED.3IONS-SCNC: 10 MMOL/L (ref 7–15)
APPEARANCE UR: CLEAR
BILIRUB UR QL STRIP: NEGATIVE
BUN SERPL-MCNC: 12.2 MG/DL (ref 6–20)
CALCIUM SERPL-MCNC: 9.4 MG/DL (ref 8.8–10.4)
CHLORIDE SERPL-SCNC: 103 MMOL/L (ref 98–107)
CHOLEST SERPL-MCNC: 196 MG/DL
COLOR UR AUTO: YELLOW
CREAT SERPL-MCNC: 0.75 MG/DL (ref 0.51–0.95)
CREAT UR-MCNC: 92.6 MG/DL
EGFRCR SERPLBLD CKD-EPI 2021: >90 ML/MIN/1.73M2
EST. AVERAGE GLUCOSE BLD GHB EST-MCNC: 117 MG/DL
FASTING STATUS PATIENT QL REPORTED: YES
FASTING STATUS PATIENT QL REPORTED: YES
GLUCOSE SERPL-MCNC: 105 MG/DL (ref 70–99)
GLUCOSE UR STRIP-MCNC: NEGATIVE MG/DL
HBA1C MFR BLD: 5.7 % (ref 0–5.6)
HCO3 SERPL-SCNC: 26 MMOL/L (ref 22–29)
HDLC SERPL-MCNC: 48 MG/DL
HGB UR QL STRIP: NEGATIVE
KETONES UR STRIP-MCNC: NEGATIVE MG/DL
LDLC SERPL CALC-MCNC: 128 MG/DL
LEUKOCYTE ESTERASE UR QL STRIP: NEGATIVE
MICROALBUMIN UR-MCNC: <12 MG/L
MICROALBUMIN/CREAT UR: NORMAL MG/G{CREAT}
NITRATE UR QL: NEGATIVE
NONHDLC SERPL-MCNC: 148 MG/DL
PH UR STRIP: 6 [PH] (ref 5–7)
POTASSIUM SERPL-SCNC: 4.4 MMOL/L (ref 3.4–5.3)
SODIUM SERPL-SCNC: 139 MMOL/L (ref 135–145)
SP GR UR STRIP: 1.02 (ref 1–1.03)
TRIGL SERPL-MCNC: 101 MG/DL
TSH SERPL DL<=0.005 MIU/L-ACNC: 1.26 UIU/ML (ref 0.3–4.2)
UROBILINOGEN UR STRIP-ACNC: 0.2 E.U./DL
VIT B12 SERPL-MCNC: 766 PG/ML (ref 232–1245)
VIT D+METAB SERPL-MCNC: 16 NG/ML (ref 20–50)

## 2025-01-06 PROCEDURE — 90471 IMMUNIZATION ADMIN: CPT

## 2025-01-06 PROCEDURE — 82607 VITAMIN B-12: CPT

## 2025-01-06 PROCEDURE — 36415 COLL VENOUS BLD VENIPUNCTURE: CPT

## 2025-01-06 PROCEDURE — 80048 BASIC METABOLIC PNL TOTAL CA: CPT

## 2025-01-06 PROCEDURE — 90480 ADMN SARSCOV2 VAC 1/ONLY CMP: CPT

## 2025-01-06 PROCEDURE — 91320 SARSCV2 VAC 30MCG TRS-SUC IM: CPT

## 2025-01-06 PROCEDURE — 82570 ASSAY OF URINE CREATININE: CPT

## 2025-01-06 PROCEDURE — 84443 ASSAY THYROID STIM HORMONE: CPT

## 2025-01-06 PROCEDURE — 83036 HEMOGLOBIN GLYCOSYLATED A1C: CPT

## 2025-01-06 PROCEDURE — 82043 UR ALBUMIN QUANTITATIVE: CPT

## 2025-01-06 PROCEDURE — 81003 URINALYSIS AUTO W/O SCOPE: CPT

## 2025-01-06 PROCEDURE — 90673 RIV3 VACCINE NO PRESERV IM: CPT

## 2025-01-06 PROCEDURE — 80061 LIPID PANEL: CPT

## 2025-01-06 PROCEDURE — 99207 PR NO CHARGE NURSE ONLY: CPT

## 2025-01-06 PROCEDURE — 82306 VITAMIN D 25 HYDROXY: CPT

## 2025-01-06 NOTE — PROGRESS NOTES
Prior to immunization administration, verified patients identity using patient s name and date of birth. Please see Immunization Activity for additional information.     Is the patient's temperature normal (100.5 or less)? Yes     Patient MEETS CRITERIA. PROCEED with vaccine administration.        1/6/2025   General Questionnaire    Do you have any questions for your care team about the vaccines you will be receiving today? no             1/6/2025   COVID   Have you had myocarditis or pericarditis (inflammation of or around the heart muscle) after getting a COVID-19 vaccine? No   Have you had a serious reaction to a COVID vaccine or something in a COVID vaccine, like polyethylene glycol (PEG) or polysorbate? No   Have you had multisystem inflammatory syndrome from COVID-19 in the past 90 days? No   Have you received a bone marrow transplant within the previous 3 months? No         Patient MEETS CRITERIA. PROCEED with vaccine administration.        Patient instructed to remain in clinic for 15 minutes afterwards, and to report any adverse reactions.      Link to Ancillary Visit Immunization Standing Orders SmartSet     Screening performed by Marli Toscano CMA on 1/6/2025 at 10:43 AM.

## 2025-01-06 NOTE — NURSING NOTE
Prior to immunization administration, verified patients identity using patient s name and date of birth. Please see Immunization Activity for additional information.     Screening Questionnaire for Adult Immunization    Are you sick today?   No   Do you have allergies to medications, food, a vaccine component or latex?   No   Have you ever had a serious reaction after receiving a vaccination?   No   Do you have a long-term health problem with heart, lung, kidney, or metabolic disease (e.g., diabetes), asthma, a blood disorder, no spleen, complement component deficiency, a cochlear implant, or a spinal fluid leak?  Are you on long-term aspirin therapy?   No   Do you have cancer, leukemia, HIV/AIDS, or any other immune system problem?   No   Do you have a parent, brother, or sister with an immune system problem?   No   In the past 3 months, have you taken medications that affect  your immune system, such as prednisone, other steroids, or anticancer drugs; drugs for the treatment of rheumatoid arthritis, Crohn s disease, or psoriasis; or have you had radiation treatments?   No   Have you had a seizure, or a brain or other nervous system problem?   No   During the past year, have you received a transfusion of blood or blood    products, or been given immune (gamma) globulin or antiviral drug?   No   For women: Are you pregnant or is there a chance you could become       pregnant during the next month?   No   Have you received any vaccinations in the past 4 weeks?   No     Immunization questionnaire answers were all negative.    I have reviewed the following standing orders:   This patient is due and qualifies for the Covid-19 vaccine.     Click here for COVID-19 Standing Order    I have reviewed the vaccines inclusion and exclusion criteria; No concerns regarding eligibility.     This patient is due and qualifies for the Influenza vaccine.    Click here for Influenza Vaccine Standing Order    I have reviewed the  vaccines inclusion and exclusion criteria; No concerns regarding eligibility.     Patient instructed to remain in clinic for 15 minutes afterwards, and to report any adverse reactions.     Screening performed by Malri Toscano CMA on 1/6/2025 at 10:43 AM.

## 2025-01-07 NOTE — RESULT ENCOUNTER NOTE
Ruth,  Thanks for coming in for the blood work.  Your cholesterol panel shows the LDL (bad cholesterol) has not changed at all since last year and is slightly above goal range.  However, the HDL (good cholesterol) has dropped a bit and is now low.  Were not quite to the level that you would require medication management of your cholesterol but I would encourage you to continue to work on lifestyle measures to keep your cardiovascular risks down.  Kidney and electrolyte panel is normal except for the mild elevation of your fasting glucose.  This remains in the prediabetic range.  Also, the A1c level which looks at your average glucose over 3 months has also popped up into the prediabetic range.  Lifestyle measures will help to lower your blood glucose levels and lower the risks of diabetes. These measures include regular exercise, weight loss/maintaining a healthy body weight, and moderating/decreasing carbohydrates (sugar, bread, pasta, rice, baked goods, potatoes, alcohol, etc) in your diet. We will continue to monitor your blood glucose annually, but please be seen sooner  if you develop any new signs or symptoms of diabetes (increase thirst or urination, fatigue, blurry vision, unexplained weight loss).    The urine protein level was normal.  The urinalysis was normal with no signs of blood present.  Your vitamin B12 looks great.  Please continue supplementation  Your vitamin D level however is low.  Please start or increase vitamin D 3 supplementation by taking 25 mcg (same as 1,000 international unit(s)) per day to get your vitamin D level back up into the normal range.  This is quite important for your bone health and generally can improve energy and muscle health too.  Your thyroid test was normal  Please MyChart or call if you have any concerns or questions.   Sincerely,  Renee Mendez MD    Recommendations to reduce cholesterol and cardiovascular risks:  Diet:  -Try to eat more vegetables, fruits,  legumes, nuts/seeds, whole grains, and fish.  - try to eat less red meat, processed meats, processed foods, sweetened beverages.   - try to replace saturated fat in your diet with mono- and poly-unsaturated fats   Exercise:  Aim for regular exercise with a goal of 150 min of moderate to high intensity aerobic exercise per week        The 10-year ASCVD risk score (Hever MILES, et al., 2019) is: 3.5%    Values used to calculate the score:      Age: 59 years      Sex: Female      Is Non- : No      Diabetic: No      Tobacco smoker: No      Systolic Blood Pressure: 120 mmHg      Is BP treated: Yes      HDL Cholesterol: 48 mg/dL      Total Cholesterol: 196 mg/dL

## 2025-01-14 ENCOUNTER — TELEPHONE (OUTPATIENT)
Dept: FAMILY MEDICINE | Facility: CLINIC | Age: 60
End: 2025-01-14
Payer: COMMERCIAL

## 2025-01-14 NOTE — TELEPHONE ENCOUNTER
----- Message from Renee Mendez sent at 1/14/2025 10:17 AM CST -----  Patient did not read [facility] message. Please send results and my comments in letter to patient. Please document when completed. Thanks!

## 2025-06-14 ENCOUNTER — HEALTH MAINTENANCE LETTER (OUTPATIENT)
Age: 60
End: 2025-06-14

## (undated) DEVICE — SOL WATER IRRIG 1000ML BOTTLE 07139-09

## (undated) DEVICE — PREP CHLORAPREP 26ML TINTED ORANGE  260815

## (undated) RX ORDER — FENTANYL CITRATE 50 UG/ML
INJECTION, SOLUTION INTRAMUSCULAR; INTRAVENOUS
Status: DISPENSED
Start: 2023-03-24

## (undated) RX ORDER — FENTANYL CITRATE 50 UG/ML
INJECTION, SOLUTION INTRAMUSCULAR; INTRAVENOUS
Status: DISPENSED
Start: 2018-03-26

## (undated) RX ORDER — SIMETHICONE 40MG/0.6ML
SUSPENSION, DROPS(FINAL DOSAGE FORM)(ML) ORAL
Status: DISPENSED
Start: 2018-03-26